# Patient Record
Sex: FEMALE | Race: WHITE | NOT HISPANIC OR LATINO
[De-identification: names, ages, dates, MRNs, and addresses within clinical notes are randomized per-mention and may not be internally consistent; named-entity substitution may affect disease eponyms.]

---

## 2018-04-17 ENCOUNTER — TRANSCRIPTION ENCOUNTER (OUTPATIENT)
Age: 63
End: 2018-04-17

## 2018-04-17 ENCOUNTER — INPATIENT (INPATIENT)
Facility: HOSPITAL | Age: 63
LOS: 2 days | Discharge: HOME | End: 2018-04-20
Attending: INTERNAL MEDICINE | Admitting: INTERNAL MEDICINE
Payer: COMMERCIAL

## 2018-04-17 VITALS
TEMPERATURE: 98 F | HEART RATE: 118 BPM | RESPIRATION RATE: 20 BRPM | OXYGEN SATURATION: 97 % | SYSTOLIC BLOOD PRESSURE: 192 MMHG | DIASTOLIC BLOOD PRESSURE: 122 MMHG

## 2018-04-17 DIAGNOSIS — I21.3 ST ELEVATION (STEMI) MYOCARDIAL INFARCTION OF UNSPECIFIED SITE: ICD-10-CM

## 2018-04-17 DIAGNOSIS — D75.1 SECONDARY POLYCYTHEMIA: ICD-10-CM

## 2018-04-17 DIAGNOSIS — Z29.9 ENCOUNTER FOR PROPHYLACTIC MEASURES, UNSPECIFIED: ICD-10-CM

## 2018-04-17 DIAGNOSIS — I10 ESSENTIAL (PRIMARY) HYPERTENSION: ICD-10-CM

## 2018-04-17 DIAGNOSIS — I63.9 CEREBRAL INFARCTION, UNSPECIFIED: ICD-10-CM

## 2018-04-17 LAB
ALBUMIN SERPL ELPH-MCNC: 4.3 G/DL — SIGNIFICANT CHANGE UP (ref 3.3–5)
ALBUMIN SERPL ELPH-MCNC: 4.3 G/DL — SIGNIFICANT CHANGE UP (ref 3.3–5)
ALP SERPL-CCNC: 85 U/L — SIGNIFICANT CHANGE UP (ref 40–120)
ALP SERPL-CCNC: 85 U/L — SIGNIFICANT CHANGE UP (ref 40–120)
ALT FLD-CCNC: 26 U/L — SIGNIFICANT CHANGE UP (ref 4–33)
ALT FLD-CCNC: 28 U/L — SIGNIFICANT CHANGE UP (ref 4–33)
APTT BLD: 121.6 SEC — CRITICAL HIGH (ref 27.5–37.4)
APTT BLD: 27.7 SEC — SIGNIFICANT CHANGE UP (ref 27.5–37.4)
AST SERPL-CCNC: 43 U/L — HIGH (ref 4–32)
AST SERPL-CCNC: 45 U/L — HIGH (ref 4–32)
BASE EXCESS BLDV CALC-SCNC: 2.4 MMOL/L — SIGNIFICANT CHANGE UP
BASOPHILS # BLD AUTO: 0.09 K/UL — SIGNIFICANT CHANGE UP (ref 0–0.2)
BASOPHILS # BLD AUTO: 0.12 K/UL — SIGNIFICANT CHANGE UP (ref 0–0.2)
BASOPHILS NFR BLD AUTO: 0.8 % — SIGNIFICANT CHANGE UP (ref 0–2)
BASOPHILS NFR BLD AUTO: 0.9 % — SIGNIFICANT CHANGE UP (ref 0–2)
BILIRUB SERPL-MCNC: 0.4 MG/DL — SIGNIFICANT CHANGE UP (ref 0.2–1.2)
BILIRUB SERPL-MCNC: 0.4 MG/DL — SIGNIFICANT CHANGE UP (ref 0.2–1.2)
BLD GP AB SCN SERPL QL: NEGATIVE — SIGNIFICANT CHANGE UP
BLOOD GAS VENOUS - CREATININE: 0.68 MG/DL — SIGNIFICANT CHANGE UP (ref 0.5–1.3)
BUN SERPL-MCNC: 10 MG/DL — SIGNIFICANT CHANGE UP (ref 7–23)
BUN SERPL-MCNC: 10 MG/DL — SIGNIFICANT CHANGE UP (ref 7–23)
CALCIUM SERPL-MCNC: 9.2 MG/DL — SIGNIFICANT CHANGE UP (ref 8.4–10.5)
CALCIUM SERPL-MCNC: 9.2 MG/DL — SIGNIFICANT CHANGE UP (ref 8.4–10.5)
CHLORIDE BLDV-SCNC: 107 MMOL/L — SIGNIFICANT CHANGE UP (ref 96–108)
CHLORIDE SERPL-SCNC: 96 MMOL/L — LOW (ref 98–107)
CHLORIDE SERPL-SCNC: 97 MMOL/L — LOW (ref 98–107)
CK MB BLD-MCNC: 12.6 — HIGH (ref 0–2.5)
CK MB BLD-MCNC: 301.2 NG/ML — HIGH (ref 1–4.7)
CK SERPL-CCNC: 2392 U/L — HIGH (ref 25–170)
CK SERPL-CCNC: 354 U/L — HIGH (ref 25–170)
CK SERPL-CCNC: 358 U/L — HIGH (ref 25–170)
CO2 SERPL-SCNC: 20 MMOL/L — LOW (ref 22–31)
CO2 SERPL-SCNC: 22 MMOL/L — SIGNIFICANT CHANGE UP (ref 22–31)
CREAT SERPL-MCNC: 0.7 MG/DL — SIGNIFICANT CHANGE UP (ref 0.5–1.3)
CREAT SERPL-MCNC: 0.72 MG/DL — SIGNIFICANT CHANGE UP (ref 0.5–1.3)
EOSINOPHIL # BLD AUTO: 0.06 K/UL — SIGNIFICANT CHANGE UP (ref 0–0.5)
EOSINOPHIL # BLD AUTO: 0.08 K/UL — SIGNIFICANT CHANGE UP (ref 0–0.5)
EOSINOPHIL NFR BLD AUTO: 0.5 % — SIGNIFICANT CHANGE UP (ref 0–6)
EOSINOPHIL NFR BLD AUTO: 0.6 % — SIGNIFICANT CHANGE UP (ref 0–6)
GAS PNL BLDV: 133 MMOL/L — LOW (ref 136–146)
GLUCOSE BLDV-MCNC: 114 — HIGH (ref 70–99)
GLUCOSE SERPL-MCNC: 109 MG/DL — HIGH (ref 70–99)
GLUCOSE SERPL-MCNC: 114 MG/DL — HIGH (ref 70–99)
HBA1C BLD-MCNC: 5.2 % — SIGNIFICANT CHANGE UP (ref 4–5.6)
HCO3 BLDV-SCNC: 26 MMOL/L — SIGNIFICANT CHANGE UP (ref 20–27)
HCT VFR BLD CALC: 50.7 % — HIGH (ref 34.5–45)
HCT VFR BLD CALC: 53 % — HIGH (ref 34.5–45)
HCT VFR BLDV CALC: 51.6 % — HIGH (ref 34.5–45)
HGB BLD-MCNC: 16.6 G/DL — HIGH (ref 11.5–15.5)
HGB BLD-MCNC: 17.8 G/DL — HIGH (ref 11.5–15.5)
HGB BLDV-MCNC: 16.9 G/DL — HIGH (ref 11.5–15.5)
IMM GRANULOCYTES # BLD AUTO: 0.04 # — SIGNIFICANT CHANGE UP
IMM GRANULOCYTES # BLD AUTO: 0.05 # — SIGNIFICANT CHANGE UP
IMM GRANULOCYTES NFR BLD AUTO: 0.3 % — SIGNIFICANT CHANGE UP (ref 0–1.5)
IMM GRANULOCYTES NFR BLD AUTO: 0.4 % — SIGNIFICANT CHANGE UP (ref 0–1.5)
INR BLD: 0.9 — SIGNIFICANT CHANGE UP (ref 0.88–1.17)
INR BLD: 3.45 — HIGH (ref 0.88–1.17)
LACTATE BLDV-MCNC: 3.5 MMOL/L — HIGH (ref 0.5–2)
LYMPHOCYTES # BLD AUTO: 2.51 K/UL — SIGNIFICANT CHANGE UP (ref 1–3.3)
LYMPHOCYTES # BLD AUTO: 2.85 K/UL — SIGNIFICANT CHANGE UP (ref 1–3.3)
LYMPHOCYTES # BLD AUTO: 21 % — SIGNIFICANT CHANGE UP (ref 13–44)
LYMPHOCYTES # BLD AUTO: 21.8 % — SIGNIFICANT CHANGE UP (ref 13–44)
MCHC RBC-ENTMCNC: 31.1 PG — SIGNIFICANT CHANGE UP (ref 27–34)
MCHC RBC-ENTMCNC: 32.2 PG — SIGNIFICANT CHANGE UP (ref 27–34)
MCHC RBC-ENTMCNC: 32.7 % — SIGNIFICANT CHANGE UP (ref 32–36)
MCHC RBC-ENTMCNC: 33.6 % — SIGNIFICANT CHANGE UP (ref 32–36)
MCV RBC AUTO: 95.1 FL — SIGNIFICANT CHANGE UP (ref 80–100)
MCV RBC AUTO: 96 FL — SIGNIFICANT CHANGE UP (ref 80–100)
MONOCYTES # BLD AUTO: 0.96 K/UL — HIGH (ref 0–0.9)
MONOCYTES # BLD AUTO: 1.17 K/UL — HIGH (ref 0–0.9)
MONOCYTES NFR BLD AUTO: 8 % — SIGNIFICANT CHANGE UP (ref 2–14)
MONOCYTES NFR BLD AUTO: 9 % — SIGNIFICANT CHANGE UP (ref 2–14)
NEUTROPHILS # BLD AUTO: 8.29 K/UL — HIGH (ref 1.8–7.4)
NEUTROPHILS # BLD AUTO: 8.78 K/UL — HIGH (ref 1.8–7.4)
NEUTROPHILS NFR BLD AUTO: 67.3 % — SIGNIFICANT CHANGE UP (ref 43–77)
NEUTROPHILS NFR BLD AUTO: 69.4 % — SIGNIFICANT CHANGE UP (ref 43–77)
NRBC # FLD: 0 — SIGNIFICANT CHANGE UP
NRBC # FLD: 0 — SIGNIFICANT CHANGE UP
PCO2 BLDV: 43 MMHG — SIGNIFICANT CHANGE UP (ref 41–51)
PH BLDV: 7.41 PH — SIGNIFICANT CHANGE UP (ref 7.32–7.43)
PLATELET # BLD AUTO: 272 K/UL — SIGNIFICANT CHANGE UP (ref 150–400)
PLATELET # BLD AUTO: 324 K/UL — SIGNIFICANT CHANGE UP (ref 150–400)
PMV BLD: 10.3 FL — SIGNIFICANT CHANGE UP (ref 7–13)
PMV BLD: 10.5 FL — SIGNIFICANT CHANGE UP (ref 7–13)
PO2 BLDV: 38 MMHG — SIGNIFICANT CHANGE UP (ref 35–40)
POTASSIUM BLDV-SCNC: 4.6 MMOL/L — HIGH (ref 3.4–4.5)
POTASSIUM SERPL-MCNC: 4.6 MMOL/L — SIGNIFICANT CHANGE UP (ref 3.5–5.3)
POTASSIUM SERPL-MCNC: 4.7 MMOL/L — SIGNIFICANT CHANGE UP (ref 3.5–5.3)
POTASSIUM SERPL-SCNC: 4.6 MMOL/L — SIGNIFICANT CHANGE UP (ref 3.5–5.3)
POTASSIUM SERPL-SCNC: 4.7 MMOL/L — SIGNIFICANT CHANGE UP (ref 3.5–5.3)
PROT SERPL-MCNC: 7.3 G/DL — SIGNIFICANT CHANGE UP (ref 6–8.3)
PROT SERPL-MCNC: 7.3 G/DL — SIGNIFICANT CHANGE UP (ref 6–8.3)
PROTHROM AB SERPL-ACNC: 10.3 SEC — SIGNIFICANT CHANGE UP (ref 9.8–13.1)
PROTHROM AB SERPL-ACNC: 40.7 SEC — HIGH (ref 9.8–13.1)
RBC # BLD: 5.33 M/UL — HIGH (ref 3.8–5.2)
RBC # BLD: 5.52 M/UL — HIGH (ref 3.8–5.2)
RBC # FLD: 13.2 % — SIGNIFICANT CHANGE UP (ref 10.3–14.5)
RBC # FLD: 13.4 % — SIGNIFICANT CHANGE UP (ref 10.3–14.5)
RH IG SCN BLD-IMP: POSITIVE — SIGNIFICANT CHANGE UP
SAO2 % BLDV: 71.2 % — SIGNIFICANT CHANGE UP (ref 60–85)
SODIUM SERPL-SCNC: 137 MMOL/L — SIGNIFICANT CHANGE UP (ref 135–145)
SODIUM SERPL-SCNC: 137 MMOL/L — SIGNIFICANT CHANGE UP (ref 135–145)
TROPONIN T SERPL-MCNC: 0.13 NG/ML — HIGH (ref 0–0.06)
TROPONIN T SERPL-MCNC: 0.19 NG/ML — HIGH (ref 0–0.06)
TROPONIN T SERPL-MCNC: 4.62 NG/ML — HIGH (ref 0–0.06)
WBC # BLD: 11.95 K/UL — HIGH (ref 3.8–10.5)
WBC # BLD: 13.05 K/UL — HIGH (ref 3.8–10.5)
WBC # FLD AUTO: 11.95 K/UL — HIGH (ref 3.8–10.5)
WBC # FLD AUTO: 13.05 K/UL — HIGH (ref 3.8–10.5)

## 2018-04-17 PROCEDURE — 76937 US GUIDE VASCULAR ACCESS: CPT | Mod: 26

## 2018-04-17 PROCEDURE — 93458 L HRT ARTERY/VENTRICLE ANGIO: CPT | Mod: 26,59

## 2018-04-17 PROCEDURE — 92941 PRQ TRLML REVSC TOT OCCL AMI: CPT | Mod: LD

## 2018-04-17 RX ORDER — TICAGRELOR 90 MG/1
180 TABLET ORAL ONCE
Qty: 0 | Refills: 0 | Status: COMPLETED | OUTPATIENT
Start: 2018-04-17 | End: 2018-04-17

## 2018-04-17 RX ORDER — HEPARIN SODIUM 5000 [USP'U]/ML
INJECTION INTRAVENOUS; SUBCUTANEOUS
Qty: 25000 | Refills: 0 | Status: DISCONTINUED | OUTPATIENT
Start: 2018-04-17 | End: 2018-04-17

## 2018-04-17 RX ORDER — ATORVASTATIN CALCIUM 80 MG/1
80 TABLET, FILM COATED ORAL AT BEDTIME
Qty: 0 | Refills: 0 | Status: DISCONTINUED | OUTPATIENT
Start: 2018-04-17 | End: 2018-04-20

## 2018-04-17 RX ORDER — LANOLIN ALCOHOL/MO/W.PET/CERES
3 CREAM (GRAM) TOPICAL AT BEDTIME
Qty: 0 | Refills: 0 | Status: DISCONTINUED | OUTPATIENT
Start: 2018-04-17 | End: 2018-04-20

## 2018-04-17 RX ORDER — ASPIRIN/CALCIUM CARB/MAGNESIUM 324 MG
81 TABLET ORAL DAILY
Qty: 0 | Refills: 0 | Status: DISCONTINUED | OUTPATIENT
Start: 2018-04-18 | End: 2018-04-18

## 2018-04-17 RX ORDER — ASPIRIN/CALCIUM CARB/MAGNESIUM 324 MG
325 TABLET ORAL ONCE
Qty: 0 | Refills: 0 | Status: COMPLETED | OUTPATIENT
Start: 2018-04-17 | End: 2018-04-17

## 2018-04-17 RX ORDER — TICAGRELOR 90 MG/1
90 TABLET ORAL
Qty: 0 | Refills: 0 | Status: DISCONTINUED | OUTPATIENT
Start: 2018-04-18 | End: 2018-04-20

## 2018-04-17 RX ORDER — METOPROLOL TARTRATE 50 MG
25 TABLET ORAL
Qty: 0 | Refills: 0 | Status: DISCONTINUED | OUTPATIENT
Start: 2018-04-17 | End: 2018-04-20

## 2018-04-17 RX ORDER — HEPARIN SODIUM 5000 [USP'U]/ML
4000 INJECTION INTRAVENOUS; SUBCUTANEOUS ONCE
Qty: 0 | Refills: 0 | Status: DISCONTINUED | OUTPATIENT
Start: 2018-04-17 | End: 2018-04-17

## 2018-04-17 RX ORDER — CHLORHEXIDINE GLUCONATE 213 G/1000ML
1 SOLUTION TOPICAL
Qty: 0 | Refills: 0 | Status: DISCONTINUED | OUTPATIENT
Start: 2018-04-17 | End: 2018-04-20

## 2018-04-17 RX ORDER — HEPARIN SODIUM 5000 [USP'U]/ML
4000 INJECTION INTRAVENOUS; SUBCUTANEOUS EVERY 6 HOURS
Qty: 0 | Refills: 0 | Status: DISCONTINUED | OUTPATIENT
Start: 2018-04-17 | End: 2018-04-17

## 2018-04-17 RX ORDER — HEPARIN SODIUM 5000 [USP'U]/ML
5000 INJECTION INTRAVENOUS; SUBCUTANEOUS EVERY 8 HOURS
Qty: 0 | Refills: 0 | Status: DISCONTINUED | OUTPATIENT
Start: 2018-04-17 | End: 2018-04-20

## 2018-04-17 RX ORDER — MORPHINE SULFATE 50 MG/1
2 CAPSULE, EXTENDED RELEASE ORAL EVERY 6 HOURS
Qty: 0 | Refills: 0 | Status: DISCONTINUED | OUTPATIENT
Start: 2018-04-17 | End: 2018-04-20

## 2018-04-17 RX ADMIN — MORPHINE SULFATE 2 MILLIGRAM(S): 50 CAPSULE, EXTENDED RELEASE ORAL at 15:43

## 2018-04-17 RX ADMIN — HEPARIN SODIUM 5000 UNIT(S): 5000 INJECTION INTRAVENOUS; SUBCUTANEOUS at 21:38

## 2018-04-17 RX ADMIN — Medication 25 MILLIGRAM(S): at 21:38

## 2018-04-17 RX ADMIN — Medication 25 MILLIGRAM(S): at 12:18

## 2018-04-17 RX ADMIN — TICAGRELOR 180 MILLIGRAM(S): 90 TABLET ORAL at 09:58

## 2018-04-17 RX ADMIN — MORPHINE SULFATE 2 MILLIGRAM(S): 50 CAPSULE, EXTENDED RELEASE ORAL at 16:00

## 2018-04-17 RX ADMIN — Medication 3 MILLIGRAM(S): at 21:38

## 2018-04-17 RX ADMIN — Medication 325 MILLIGRAM(S): at 09:58

## 2018-04-17 RX ADMIN — ATORVASTATIN CALCIUM 80 MILLIGRAM(S): 80 TABLET, FILM COATED ORAL at 21:38

## 2018-04-17 NOTE — DISCHARGE NOTE ADULT - MEDICATION SUMMARY - MEDICATIONS TO TAKE
I will START or STAY ON the medications listed below when I get home from the hospital:    aspirin 81 mg oral delayed release tablet  -- 1 tab(s) by mouth once a day  -- Indication: For ST elevation myocardial infarction (STEMI)    lisinopril 5 mg oral tablet  -- 1 tab(s) by mouth once a day  -- Indication: For ST elevation myocardial infarction (STEMI)    atorvastatin 80 mg oral tablet  -- 1 tab(s) by mouth once a day (at bedtime)  -- Indication: For ST elevation myocardial infarction (STEMI)    ticagrelor 90 mg oral tablet  -- 1 tab(s) by mouth 2 times a day  -- Indication: For ST elevation myocardial infarction (STEMI)    Metoprolol Succinate ER 50 mg oral tablet, extended release  -- 1 tab(s) by mouth once a day  -- Indication: For ST elevation myocardial infarction (STEMI)

## 2018-04-17 NOTE — H&P ADULT - NSHPSOCIALHISTORY_GEN_ALL_CORE
Work: retired .   Drugs: EtOH abuse - 2 shots of gins every day (last day night of 4/16), smoker in past (4 cigarettes for 10 years, quit 20 years ago), no other IVDU.   Home: lives at home with .

## 2018-04-17 NOTE — DISCHARGE NOTE ADULT - CARE PLAN
Principal Discharge DX:	ST elevation myocardial infarction involving left anterior descending (LAD) coronary artery  Goal:	remain painfree  Assessment and plan of treatment:	follow DASH/LF diet, take all meds as prescribed, followup as instructed Principal Discharge DX:	ST elevation myocardial infarction involving left anterior descending (LAD) coronary artery  Goal:	prevention of repeat heart attack  Assessment and plan of treatment:	You must take your medications as prescribed. Most importantly, Brillinta and Aspirin are very important. If you do not take these medications, the stent placed to open up your coronary artery (the vessels that supply your heart) can clog up again. These medications will also prevent future episodes of heart attacks and strokes. It is very important for you to take these medications and to follow up with a physician. Please call and make an appointment at the number below.     Cardiology Clinic, [address]  [address] [place] [address]  Phone: [phone]  Fax: [phone] Principal Discharge DX:	ST elevation myocardial infarction involving left anterior descending (LAD) coronary artery  Goal:	prevention of repeat heart attack  Assessment and plan of treatment:	You must take your medications as prescribed. Most importantly, Brillinta and Aspirin are very important. If you do not take these medications, the stent placed to open up your coronary artery (the vessels that supply your heart) can clog up again. These medications will also prevent future episodes of heart attacks and strokes. It is very important for you to take these medications and to follow up with a physician. We have scheduled an appointment for you on May 15th at 1:45PM at the cardiology clinic (you will be seeing Dr. Pamela Webster). The clinic is located on the 4th floor in the oncology building of [facility].      Cardiology Clinic, [address]  [address] [place] [address]  Phone: [phone]  Fax: [phone]

## 2018-04-17 NOTE — H&P ADULT - PROBLEM SELECTOR PLAN 1
- s/p aspirin and brillinta load, PCI to LAD  - high intensity statin, metoprolol 25 BID started.   - aspirin 81 daily and brillinta 90 BID to start tomorrow.   - medication education, given patient's history of nonadherence. - s/p aspirin and brillinta load, PCI to p/mid-LAD.  - high intensity statin, metoprolol 25 BID started.   - aspirin 81 daily and brillinta 90 BID to start tomorrow.   - will trend cardiac enzymes to ensure downtrend.   - medication education, given patient's history of nonadherence.

## 2018-04-17 NOTE — DISCHARGE NOTE ADULT - CARE PROVIDER_API CALL
Cardiology Clinic, [facility]  [address] [address] [place] [address]  [id number] [place] [address]  Phone: [phone]  Fax: [phone]

## 2018-04-17 NOTE — DISCHARGE NOTE ADULT - HOSPITAL COURSE
62F with CVA and subclinical hypothyroidism presents with 4 day h/o chest pressure with exertion and B/L arm pain (patient had been gardening) taking Aleve thinking this was musculoskeletal. Today arm and chest pain were persistent and she came to ER with EKG + for AWSTEMI, Taken to cath lab with TRI X 1 to long pLAD lesion. Of note patient admits to being non-adherent to medical regimen, has not had followup in 7 years, stopped taking ASA and statin, was also recommended to undergo hypercoagulability w/u after CVA but did not do so. In CCU patient has been stable, metoprolol has been started. Awaiting eventual initiation of ACE and ECHO planned for 4/20. 62F with CVA and subclinical hypothyroidism presents with 4 day h/o chest pressure with exertion and B/L arm pain (patient had been gardening) taking Aleve thinking this was musculoskeletal. Today arm and chest pain were persistent and she came to ER with EKG + for AWSTEMI, Taken to cath lab with TRI X 1 to long pLAD lesion. Of note patient admits to being non-adherent to medical regimen, has not had followup in 7 years, stopped taking ASA and statin, was also recommended to undergo hypercoagulability w/u after CVA but did not do so. In CCU patient has been stable, metoprolol has been started. Lisinopril started and uptitrated to 5mg daily. TTE showed moderately reduced LV systolic dysfunction, hypokinesis of apex/distal septum/distal anterior wall, and no LV thrombus. RV function grossly normal. 62F with CVA and subclinical hypothyroidism presents with 4 day h/o chest pressure with exertion and B/L arm pain (patient had been gardening) taking Aleve thinking this was musculoskeletal. Today arm and chest pain were persistent and she came to ER with EKG + for AWSTEMI in setting of positive and uptrending Troponin T, Taken to cath lab with TRI X 1 to long pLAD lesion. Of note patient admits to being non-adherent to medical regimen, has not had followup in 7 years, stopped taking ASA and statin, was also recommended to undergo hypercoagulability w/u after CVA but did not do so. In CCU, patient was started on and discharged on Toprol XL 50, lisinopril 5, Brillinta 90BID, Aspirin 81mg daily. TTE showed moderately reduced LV systolic dysfunction, hypokinesis of apex/distal septum/distal anterior wall, and no LV thrombus. RV function grossly normal. Of note, patient had history of EtOH abuse with at least 2 shots of gin every night prior to presentation. However, there were no signs of EtOH withdrawal. Patient noted to have polycythemia, which is thought to have been 2/2 hypoxia from underlying undiagnosed YESSICA, given patient's body habitus. Instructed to follow up as outpatient (i.e. outpatient sleep study). Patient remained stable throughout CCU course.

## 2018-04-17 NOTE — H&P ADULT - NSHPOUTPATIENTPROVIDERS_GEN_ALL_CORE
no PCP, has not followed up in 7 years, does not remember name no PCP, has not followed up in 7 years, does not remember name.  No cardiologist.

## 2018-04-17 NOTE — H&P ADULT - ASSESSMENT
62-year-old lady with CVA (2015, MCA, left basal ganglia and frontal infarction, no tele events) and subclinical hypothyroidism, presenting with intermittent chest pressure radiating to left arm for several days. Found to have 62-year-old lady with CVA (2015, MCA, left basal ganglia and frontal infarction, no tele events) and subclinical hypothyroidism, presenting with intermittent chest pressure with radiation bilaterally to upper extremities. 62-year-old lady with CVA (2015, MCA, left basal ganglia and frontal infarction, no tele events) and subclinical hypothyroidism, presenting with intermittent chest pressure with radiation bilaterally to upper extremities. Found to have STEMI 62-year-old lady with CVA (2015, MCA, left basal ganglia and frontal infarction, no tele events) and subclinical hypothyroidism, presenting with intermittent chest pressure with radiation bilaterally to upper extremities. Found to have AWSTEMI, now s/p PCI to LAD. 62-year-old lady with CVA (2015, MCA, left basal ganglia and frontal infarction, no tele events) and subclinical hypothyroidism, presenting with intermittent chest pressure with radiation bilaterally to upper extremities. Found to have AWSTEMI, now s/p PCI with 1 stent to proximal and mid-LAD.

## 2018-04-17 NOTE — ED PROVIDER NOTE - OBJECTIVE STATEMENT
61 yo F PMHx CVA 3-4 years ago not on any medications p/w bilateral arm pain x 3 days. Pt has been taking Aleve at home with some relief and then this AM at 0400 pain resumed and was associated with chest tightness. She took Aleve this AM before she came and it did not help with her symptoms. She denies SOB, N/V, abd pain. Denies tobacco, drinks ETOH "problematically."

## 2018-04-17 NOTE — DISCHARGE NOTE ADULT - NS AS ACTIVITY OBS
No Heavy lifting/straining/Stairs allowed/Walking-Outdoors allowed/Bathing allowed/Walking-Indoors allowed

## 2018-04-17 NOTE — H&P ADULT - HISTORY OF PRESENT ILLNESS
62-year-old lady with CVA (2015, MCA, left basal ganglia and frontal infarction, no tele events) and subclinical hypothyroidism, presenting with intermittent chest pressure radiating to left arm for several days. Presented to ED with worsening chest and arm pain that woke patient up from her sleep. EKG in ED                   RADIOLOGY & ADDITIONAL TESTS:    Imaging Personally Reviewed:  [ ] YES  [ ] NO    HEALTH ISSUES - PROBLEM Dx: 62-year-old lady with CVA (2015, MCA, left basal ganglia and frontal infarction, still w/ residual word-finding difficulty) and subclinical hypothyroidism, presenting with intermittent chest pressure with stabbing pain radiating bilaterally to arms. Patient states that she had exertional chest pain (noticed when pulling weeds in garden) for 4 days prior to presentation. Felt that arm pain was musculoskeletal in nature and took Alleve without symptomatic relief. Presented to ED with worsening chest and arm pain that woke patient up from her sleep. Denies any recent illnesses, stressors, travel, sick contacts. Denies any associated fever, chills, palpitations, dyspnea, edema in extremities. Of note, patient is non-adherent to medications and has no medical follow up (saw PCP 7 years ago). She had been discharged on Aspirin and Atorvastatin with instructions for a hypercoagulable w/u after presenting with CVA 5 years ago to [facility]. However, patient has not been taking any medications (stopped taking Aspirin 2 years ago) and has not followed up as instructed.     In the ED, vital significant for hypertension to 192/122, HR 110s-120s, % on RA, afebrile. EKG with ST elevations in all precordial leads. Cardiac enzymes positive and uptrending on second set. Cath lab activated. Patient aspirin and brillinta loaded, startet on heparin gtt. PCI to LAD. 62-year-old lady with CVA (2015, MCA, left basal ganglia and frontal infarction, still w/ residual word-finding difficulty) and subclinical hypothyroidism, presenting with intermittent chest pressure with stabbing pain radiating bilaterally to arms. Patient states that she had exertional chest pain (noticed when pulling weeds in garden) for 4 days prior to presentation. Felt that arm pain was musculoskeletal in nature and took Alleve without symptomatic relief. Presented to ED with worsening chest and arm pain that woke patient up from her sleep. Denies any recent illnesses, stressors, travel, sick contacts. Denies any associated fever, chills, palpitations, dyspnea, edema in extremities. Of note, patient is non-adherent to medications and has no medical follow up (saw PCP 7 years ago). She had been discharged on Aspirin and Atorvastatin with instructions for a hypercoagulable w/u after presenting with CVA 5 years ago to [facility]. However, patient has not been taking any medications (stopped taking Aspirin 2 years ago) and has not followed up as instructed.     In the ED, vital significant for hypertension to 192/122, HR 110s-120s, % on RA, afebrile. EKG with ST elevations in all precordial leads. Cardiac enzymes positive and uptrending on second set. Cath lab activated. Patient aspirin and brillinta loaded, startet on heparin gtt. PCI to LAD. 62-year-old lady with CVA (2015, MCA, left basal ganglia and frontal infarction, still w/ residual word-finding difficulty) and subclinical hypothyroidism, presenting with intermittent chest pressure with stabbing pain radiating bilaterally to arms. Patient states that she had exertional chest pain (noticed when pulling weeds in garden) for 4 days prior to presentation. Felt that arm pain was musculoskeletal in nature and took Alleve without symptomatic relief. Presented to ED with worsening chest and arm pain that woke patient up from her sleep. Denies any recent illnesses, stressors, travel, sick contacts. Denies any associated fever, chills, palpitations, dyspnea, edema in extremities. Of note, patient is non-adherent to medications and has no medical follow up (saw PCP 7 years ago). She had been discharged on Aspirin and Atorvastatin with instructions for a hypercoagulable w/u after presenting with CVA 5 years ago to [facility]. However, patient has not been taking any medications (stopped taking Aspirin 2 years ago) and has not followed up as instructed.     In the ED, vital significant for hypertension to 192/122, HR 110s-120s, % on RA, afebrile. EKG with ST elevations in all precordial leads. Cardiac enzymes positive and uptrending on second set. Cath lab activated. Patient aspirin and brillinta loaded, startet on heparin gtt. PCI with 1 stent to pLAD (70% stenosis) and mid-LAD (100% stenosis).

## 2018-04-17 NOTE — ED ADULT NURSE NOTE - OBJECTIVE STATEMENT
Patient brought to ER with c/o chest pressure. Pt has family at bedside. Pt's EKG shows a STEMI and cardiac at bedside. VS recorded. IVL placed to right arm and left wrist 20 gauge and labs drawn and sent. Pt placed on Zoll and transported to cath lab. Medications given as ordered.   RENEA Ziegler

## 2018-04-17 NOTE — DISCHARGE NOTE ADULT - PLAN OF CARE
remain painfree follow DASH/LF diet, take all meds as prescribed, followup as instructed prevention of repeat heart attack You must take your medications as prescribed. Most importantly, Brillinta and Aspirin are very important. If you do not take these medications, the stent placed to open up your coronary artery (the vessels that supply your heart) can clog up again. These medications will also prevent future episodes of heart attacks and strokes. It is very important for you to take these medications and to follow up with a physician. Please call and make an appointment at the number below.     Cardiology Clinic, [address]  [address] [place] [address]  Phone: [phone]  Fax: [phone] You must take your medications as prescribed. Most importantly, Brillinta and Aspirin are very important. If you do not take these medications, the stent placed to open up your coronary artery (the vessels that supply your heart) can clog up again. These medications will also prevent future episodes of heart attacks and strokes. It is very important for you to take these medications and to follow up with a physician. We have scheduled an appointment for you on May 15th at 1:45PM at the cardiology clinic (you will be seeing Dr. Pamela Webster). The clinic is located on the 4th floor in the oncology building of [facility].      Cardiology Clinic, [address]  [address] [place] [address]  Phone: [phone]  Fax: [phone]

## 2018-04-17 NOTE — CHART NOTE - NSCHARTNOTEFT_GEN_A_CORE
RFA 6 Fr sheath removed, good hemostasis noted with 20-25 minute manual hold. No evidence of hematoma. R DP Pulse 2+ palp and with doppler. DSD in place. VS stable throughout.

## 2018-04-17 NOTE — H&P ADULT - NSHPREVIEWOFSYSTEMS_GEN_ALL_CORE
REVIEW OF SYSTEMS:  CONSTITUTIONAL: No fever, weight loss, or fatigue  EYES: No eye pain, visual disturbances, or discharge  ENMT:  No difficulty hearing, tinnitus, vertigo; No sinus or throat pain  NECK: No pain or stiffness  BREASTS: No pain, masses, or nipple discharge  RESPIRATORY: No cough, wheezing, chills or hemoptysis; No shortness of breath  CARDIOVASCULAR: No chest pain, palpitations, dizziness, or leg swelling  GASTROINTESTINAL: No abdominal or epigastric pain. No nausea, vomiting, or hematemesis; No diarrhea or constipation. No melena or hematochezia.  GENITOURINARY: No dysuria, frequency, hematuria, or incontinence  NEUROLOGICAL: No headaches, memory loss, loss of strength, numbness, or tremors  SKIN: No itching, burning, rashes, or lesions   LYMPH NODES: No enlarged glands  ENDOCRINE: No heat or cold intolerance; No hair loss  MUSCULOSKELETAL: No joint pain or swelling; No muscle, back, or extremity pain  PSYCHIATRIC: No depression, anxiety, mood swings, or difficulty sleeping  HEME/LYMPH: No easy bruising, or bleeding gums  ALLERY AND IMMUNOLOGIC: No hives or eczema REVIEW OF SYSTEMS:  CONSTITUTIONAL: No fever, weight loss, or fatigue.  EYES: No eye pain, visual disturbances, or discharge.  ENMT:  No rhinorrhea, cough.   RESPIRATORY: No cough, wheezing, SOB.   CARDIOVASCULAR: +chest pressure with stabbing arm pain bilaterally.   GASTROINTESTINAL: no abdominal pain, n/v, diarrhea.   GENITOURINARY: No dysuria.  NEUROLOGICAL: +word finding difficulty, no weakness.   ENDOCRINE: No heat or cold intolerance; No hair loss  MUSCULOSKELETAL: No joint pain or swelling. No muscular pain (+bilateral arm pain).

## 2018-04-17 NOTE — CONSULT NOTE ADULT - ASSESSMENT
62F CVA 3-4 years ago w/ 3-4 days of intermittent chest tightness and arm pain that would last for several hours, resolve and return presenting with worsening chest pain this AM    #Chest pain - story and EKG findings consistent with ACS. Cath lab activated. Aspirin and Brilinta loaded. Heparin gtt initiated. Patient consented and case discussed with Interventionalist.     MALATHI Matute MD  Cardiology Fellow

## 2018-04-17 NOTE — H&P ADULT - PROBLEM SELECTOR PLAN 4
- DVT ppx: SQH   - Diet: DASH/TLC  - Dispo: home after 4-5 day monitoring s/p STEMI. Ambulates around with no issues at baseline. Will reassess need for PT eval.     Luca Choi MD   PGY1  Pager: [phone] - Hgb/Hct increased with associated increase in RBC count.   - likely 2/2 hypoxia, suspect patient is chronically hypoxic from YESSICA (not diagnosed).   - will monitor O2 via tele throughout the night to assess need for CPAP.   - outpatient f/u.

## 2018-04-17 NOTE — H&P ADULT - PROBLEM SELECTOR PLAN 2
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  - - prior CVA in 2013 with residual word-finding difficulty.  - on aspirin 81mg, high-intensity statin.

## 2018-04-17 NOTE — H&P ADULT - PROBLEM SELECTOR PLAN 3
- not on any home antihypertensives.   - hypertension now resolved after starting metoprolol tartrate 25 BID.   - will monitor. If with persistent HTN, will start on ACE inhibitor/ARB.

## 2018-04-17 NOTE — ED ADULT TRIAGE NOTE - CHIEF COMPLAINT QUOTE
pt coming by EMS with robinson. upper arms pain x 4 days pt AOX 3 pt stated chest pressure while in her way to hosp.    denies SOB/dizziness at present time.   Hx. CVA w/o residuals

## 2018-04-17 NOTE — H&P ADULT - NSHPLABSRESULTS_GEN_ALL_CORE
LABS:                        16.6   11.95 )-----------( 324      ( 17 Apr 2018 09:52 )             50.7     04-17    137  |  96<L>  |  10  ----------------------------<  114<H>  4.7   |  22  |  0.72    Ca    9.2      17 Apr 2018 09:52    TPro  7.3  /  Alb  4.3  /  TBili  0.4  /  DBili  x   /  AST  45<H>  /  ALT  26  /  AlkPhos  85  04-17    PT/INR - ( 17 Apr 2018 09:52 )   PT: 10.3 SEC;   INR: 0.90          PTT - ( 17 Apr 2018 09:52 )  PTT:27.7 SEC

## 2018-04-17 NOTE — CONSULT NOTE ADULT - SUBJECTIVE AND OBJECTIVE BOX
Registration Time: 9:19  Called by ED: 9:40AM  Saw patient at bedside: 9:42AM  Called Cath Attendin:45    Patient seen and evaluated at bedside    Chief Complaint: Arm Pain    HPI: 62F CVA 3-4 years ago w/ 3-4 days of intermittent chest tightness and arm pain that would last for several hours, resolve and return. This morning around 4AM she woke with worsening pain radiating to the arms. She asked her  to bring her in. Patient is currently still experiencing chest tightness. She took two Alieve for the pain.      PMH:   CVA (cerebral vascular accident)  Obese      PSH:    delivery delivered      Medications:   heparin  Infusion.  Unit(s)/Hr IV Continuous <Continuous>  heparin  Injectable 4000 Unit(s) IV Push once  heparin  Injectable 4000 Unit(s) IV Push every 6 hours PRN      Allergies:  No Known Allergies      FAMILY HISTORY: Father MI      Social History:  Smoking History: Former  Alcohol Use: "Problematic dinking"  Drug Use: No    Review of Systems:  REVIEW OF SYSTEMS:    CONSTITUTIONAL: No weakness, fevers or chills  EYES/ENT: No visual changes;  No dysphagia  NECK: No pain or stiffness  RESPIRATORY: No cough, wheezing, hemoptysis; No shortness of breath  CARDIOVASCULAR: No chest pain or palpitations; No lower extremity edema  GASTROINTESTINAL: No abdominal or epigastric pain. No nausea, vomiting, or hematemesis; No diarrhea or constipation. No melena or hematochezia.  BACK: No back pain  GENITOURINARY: No dysuria, frequency or hematuria  NEUROLOGICAL: No numbness or weakness  SKIN: No itching, burning, rashes, or lesions   All other review of systems is negative unless indicated above.    Physical Exam:  T(F): 97.9 (-), Max: 97.9 ()  HR: 128 () (118 - 128)  BP: 176/112 () (176/112 - 192/122)  RR: 22 (-)  SpO2: 100% ()  GENERAL: No acute distress, well-developed  HEAD:  Atraumatic, Normocephalic  ENT: EOMI, PERRLA, conjunctiva and sclera clear, Neck supple, No JVD, moist mucosa  CHEST/LUNG: Clear to auscultation bilaterally; No wheeze, equal breath sounds bilaterally   BACK: No spinal tenderness  HEART: Regular rate and rhythm; No murmurs, rubs, or gallops  ABDOMEN: Soft, Nontender, Nondistended; Bowel sounds present  EXTREMITIES:  No clubbing, cyanosis, or edema  PSYCH: Nl behavior, nl affect  NEUROLOGY: AAOx3, non-focal, cranial nerves intact  SKIN: Normal color, No rashes or lesions  LINES:    Cardiovascular Diagnostic Testing:    ECG: Personally reviewed    Echo:    Stress Testing:    Cath:    Interpretation of Telemetry:    Imaging:    Labs: Personally reviewed

## 2018-04-17 NOTE — ED PROVIDER NOTE - MEDICAL DECISION MAKING DETAILS
63 yo F PMHx CVA 3-4 years ago not on any medications p/w bilateral arm pain and chest tightness since 0400 with EKG changes consistent with STEMI, cards cath consult called, basic labs drawn, will await cards recommendations 63 yo F PMHx CVA 3-4 years ago not on any medications p/w bilateral arm pain and chest tightness since 0400 with EKG changes consistent with STEMI, cards cath consult called, basic labs drawn, will await cards recommendations    MARTHA Leyva MD: Pt is a 61 y/o female with PMHx CVA 3-4 years ago (not on any medications) is BIBA with bilateral arm pain x 3 days (waxing and waning) associated with chest tightness that began at 4am. Pt has been taking Aleve at home with some relief until this AM. Per , pt first c/o sx 4 days ago when "pulling some weeds" in the yard. She denies SOB, N/V, abd pain, focal numbness/weakness, back pain, pleuritic CP. Denies tobacco, + ETOH. EKG demonstrates STEMI with STEs in V1-V3. STEMI alert called right away, Cardiology fellow at bedside who agrees with dx of STEMI, recommends 325mg ASA, 180mg brillinta, bolus of heparin. Will give initial medications and send pt to cardiac catheterization lab.

## 2018-04-17 NOTE — DISCHARGE NOTE ADULT - PROVIDER TOKENS
FREE:[LAST:[Cardiology Clinic],FIRST:[[facility]],PHONE:[phone]],FAX:[phone]],ADDRESS:[[address] [place] [address]]]

## 2018-04-17 NOTE — ED PROVIDER NOTE - NS ED ROS FT
GENERAL: No fever or chills, EYES: no change in vision, HEENT: no trouble swallowing or speaking, CARDIAC: chest tightness, PULMONARY: no cough or SOB, GI: no abdominal pain, no nausea, no vomiting, no diarrhea or constipation, : No changes in urination, SKIN: no rashes, NEURO: no headache,  MSK: bilateral arm pain ~Selena Olvera M.D. Resident

## 2018-04-17 NOTE — H&P ADULT - NSHPPHYSICALEXAM_GEN_ALL_CORE
T(C): 36.6 (04-17-18 @ 11:30), Max: 36.6 (04-17-18 @ 09:26)  HR: 103 (04-17-18 @ 11:30) (103 - 128)  BP: 161/98 (04-17-18 @ 11:30) (161/98 - 192/122)  RR: 21 (04-17-18 @ 11:30) (20 - 22)  SpO2: 100% (04-17-18 @ 11:30) (97% - 100%)  Wt(kg): --Vital Signs Last 24 Hrs  T(C): 36.6 (17 Apr 2018 11:30), Max: 36.6 (17 Apr 2018 09:26)  T(F): 97.8 (17 Apr 2018 11:30), Max: 97.9 (17 Apr 2018 09:26)  HR: 103 (17 Apr 2018 11:30) (103 - 128)  BP: 161/98 (17 Apr 2018 11:30) (161/98 - 192/122)  BP(mean): 114 (17 Apr 2018 11:30) (114 - 114)  RR: 21 (17 Apr 2018 11:30) (20 - 22)  SpO2: 100% (17 Apr 2018 11:30) (97% - 100%)    PHYSICAL EXAM:  GENERAL: NAD, well-groomed, well-developed  HEAD:  Atraumatic, Normocephalic  EYES: EOMI, PERRLA, conjunctiva and sclera clear  ENMT: No tonsillar erythema, exudates, or enlargement; Moist mucous membranes, Good dentition, No lesions  NECK: Supple, No JVD, Normal thyroid  NERVOUS SYSTEM:  Alert & Oriented X3, Good concentration; Motor Strength 5/5 B/L upper and lower extremities; DTRs 2+ intact and symmetric  CHEST/LUNG: Clear to percussion bilaterally; No rales, rhonchi, wheezing, or rubs  HEART: Regular rate and rhythm; No murmurs, rubs, or gallops  ABDOMEN: Soft, Nontender, Nondistended; Bowel sounds present  EXTREMITIES:  2+ Peripheral Pulses, No clubbing, cyanosis, or edema  LYMPH: No lymphadenopathy noted  SKIN: No rashes or lesions

## 2018-04-17 NOTE — DISCHARGE NOTE ADULT - PATIENT PORTAL LINK FT
You can access the [facility] Patient Portal, offered by [facility], by registering with the following website: [email or web address]

## 2018-04-17 NOTE — H&P ADULT - PROBLEM SELECTOR PLAN 5
- DVT ppx: SQH   - Diet: DASH/TLC  - Dispo: home after 4-5 day monitoring s/p STEMI. Ambulates around with no issues at baseline. Will reassess need for PT eval.     Luca Choi MD   PGY1  Pager: [phone]

## 2018-04-18 DIAGNOSIS — R94.6 ABNORMAL RESULTS OF THYROID FUNCTION STUDIES: ICD-10-CM

## 2018-04-18 DIAGNOSIS — F10.10 ALCOHOL ABUSE, UNCOMPLICATED: ICD-10-CM

## 2018-04-18 LAB
ALBUMIN SERPL ELPH-MCNC: 3.6 G/DL — SIGNIFICANT CHANGE UP (ref 3.3–5)
ALP SERPL-CCNC: 72 U/L — SIGNIFICANT CHANGE UP (ref 40–120)
ALT FLD-CCNC: 33 U/L — SIGNIFICANT CHANGE UP (ref 4–33)
AST SERPL-CCNC: 110 U/L — HIGH (ref 4–32)
BILIRUB SERPL-MCNC: 0.9 MG/DL — SIGNIFICANT CHANGE UP (ref 0.2–1.2)
BUN SERPL-MCNC: 11 MG/DL — SIGNIFICANT CHANGE UP (ref 7–23)
CALCIUM SERPL-MCNC: 8.9 MG/DL — SIGNIFICANT CHANGE UP (ref 8.4–10.5)
CHLORIDE SERPL-SCNC: 99 MMOL/L — SIGNIFICANT CHANGE UP (ref 98–107)
CHOLEST SERPL-MCNC: 233 MG/DL — HIGH (ref 120–199)
CK MB BLD-MCNC: 10.3 — HIGH (ref 0–2.5)
CK MB BLD-MCNC: 78.15 NG/ML — HIGH (ref 1–4.7)
CK SERPL-CCNC: 762 U/L — HIGH (ref 25–170)
CO2 SERPL-SCNC: 21 MMOL/L — LOW (ref 22–31)
CREAT SERPL-MCNC: 0.75 MG/DL — SIGNIFICANT CHANGE UP (ref 0.5–1.3)
GLUCOSE SERPL-MCNC: 121 MG/DL — HIGH (ref 70–99)
HCT VFR BLD CALC: 46.4 % — HIGH (ref 34.5–45)
HDLC SERPL-MCNC: 50 MG/DL — SIGNIFICANT CHANGE UP (ref 45–65)
HGB BLD-MCNC: 15.5 G/DL — SIGNIFICANT CHANGE UP (ref 11.5–15.5)
LIPID PNL WITH DIRECT LDL SERPL: 141 MG/DL — SIGNIFICANT CHANGE UP
MAGNESIUM SERPL-MCNC: 2.1 MG/DL — SIGNIFICANT CHANGE UP (ref 1.6–2.6)
MCHC RBC-ENTMCNC: 31.8 PG — SIGNIFICANT CHANGE UP (ref 27–34)
MCHC RBC-ENTMCNC: 33.4 % — SIGNIFICANT CHANGE UP (ref 32–36)
MCV RBC AUTO: 95.3 FL — SIGNIFICANT CHANGE UP (ref 80–100)
NRBC # FLD: 0 — SIGNIFICANT CHANGE UP
PHOSPHATE SERPL-MCNC: 3.4 MG/DL — SIGNIFICANT CHANGE UP (ref 2.5–4.5)
PLATELET # BLD AUTO: 279 K/UL — SIGNIFICANT CHANGE UP (ref 150–400)
PMV BLD: 10.3 FL — SIGNIFICANT CHANGE UP (ref 7–13)
POTASSIUM SERPL-MCNC: 3.7 MMOL/L — SIGNIFICANT CHANGE UP (ref 3.5–5.3)
POTASSIUM SERPL-SCNC: 3.7 MMOL/L — SIGNIFICANT CHANGE UP (ref 3.5–5.3)
PROT SERPL-MCNC: 6.1 G/DL — SIGNIFICANT CHANGE UP (ref 6–8.3)
RBC # BLD: 4.87 M/UL — SIGNIFICANT CHANGE UP (ref 3.8–5.2)
RBC # FLD: 13.6 % — SIGNIFICANT CHANGE UP (ref 10.3–14.5)
SODIUM SERPL-SCNC: 137 MMOL/L — SIGNIFICANT CHANGE UP (ref 135–145)
T3 SERPL-MCNC: 101 NG/DL — SIGNIFICANT CHANGE UP (ref 80–200)
T4 AB SER-ACNC: 7.13 UG/DL — SIGNIFICANT CHANGE UP (ref 5.1–13)
TRIGL SERPL-MCNC: 317 MG/DL — HIGH (ref 10–149)
TROPONIN T SERPL-MCNC: 2.35 NG/ML — HIGH (ref 0–0.06)
TSH SERPL-MCNC: 8.06 UIU/ML — HIGH (ref 0.27–4.2)
WBC # BLD: 9.88 K/UL — SIGNIFICANT CHANGE UP (ref 3.8–10.5)
WBC # FLD AUTO: 9.88 K/UL — SIGNIFICANT CHANGE UP (ref 3.8–10.5)

## 2018-04-18 PROCEDURE — 99233 SBSQ HOSP IP/OBS HIGH 50: CPT

## 2018-04-18 RX ORDER — DOCUSATE SODIUM 100 MG
100 CAPSULE ORAL
Qty: 0 | Refills: 0 | Status: DISCONTINUED | OUTPATIENT
Start: 2018-04-18 | End: 2018-04-20

## 2018-04-18 RX ORDER — POTASSIUM CHLORIDE 20 MEQ
20 PACKET (EA) ORAL ONCE
Qty: 0 | Refills: 0 | Status: COMPLETED | OUTPATIENT
Start: 2018-04-18 | End: 2018-04-18

## 2018-04-18 RX ORDER — SENNA PLUS 8.6 MG/1
2 TABLET ORAL AT BEDTIME
Qty: 0 | Refills: 0 | Status: DISCONTINUED | OUTPATIENT
Start: 2018-04-18 | End: 2018-04-20

## 2018-04-18 RX ORDER — LISINOPRIL 2.5 MG/1
2.5 TABLET ORAL DAILY
Qty: 0 | Refills: 0 | Status: DISCONTINUED | OUTPATIENT
Start: 2018-04-18 | End: 2018-04-19

## 2018-04-18 RX ORDER — LISINOPRIL 2.5 MG/1
2.5 TABLET ORAL DAILY
Qty: 0 | Refills: 0 | Status: DISCONTINUED | OUTPATIENT
Start: 2018-04-18 | End: 2018-04-18

## 2018-04-18 RX ORDER — ASPIRIN/CALCIUM CARB/MAGNESIUM 324 MG
81 TABLET ORAL DAILY
Qty: 0 | Refills: 0 | Status: DISCONTINUED | OUTPATIENT
Start: 2018-04-18 | End: 2018-04-20

## 2018-04-18 RX ORDER — ONDANSETRON 8 MG/1
4 TABLET, FILM COATED ORAL ONCE
Qty: 0 | Refills: 0 | Status: COMPLETED | OUTPATIENT
Start: 2018-04-18 | End: 2018-04-18

## 2018-04-18 RX ADMIN — HEPARIN SODIUM 5000 UNIT(S): 5000 INJECTION INTRAVENOUS; SUBCUTANEOUS at 06:26

## 2018-04-18 RX ADMIN — SENNA PLUS 2 TABLET(S): 8.6 TABLET ORAL at 21:22

## 2018-04-18 RX ADMIN — HEPARIN SODIUM 5000 UNIT(S): 5000 INJECTION INTRAVENOUS; SUBCUTANEOUS at 21:22

## 2018-04-18 RX ADMIN — LISINOPRIL 2.5 MILLIGRAM(S): 2.5 TABLET ORAL at 14:49

## 2018-04-18 RX ADMIN — Medication 3 MILLIGRAM(S): at 21:22

## 2018-04-18 RX ADMIN — Medication 81 MILLIGRAM(S): at 12:17

## 2018-04-18 RX ADMIN — TICAGRELOR 90 MILLIGRAM(S): 90 TABLET ORAL at 17:53

## 2018-04-18 RX ADMIN — Medication 100 MILLIGRAM(S): at 21:22

## 2018-04-18 RX ADMIN — ATORVASTATIN CALCIUM 80 MILLIGRAM(S): 80 TABLET, FILM COATED ORAL at 21:22

## 2018-04-18 RX ADMIN — Medication 25 MILLIGRAM(S): at 17:53

## 2018-04-18 RX ADMIN — Medication 20 MILLIEQUIVALENT(S): at 06:43

## 2018-04-18 RX ADMIN — HEPARIN SODIUM 5000 UNIT(S): 5000 INJECTION INTRAVENOUS; SUBCUTANEOUS at 14:49

## 2018-04-18 RX ADMIN — CHLORHEXIDINE GLUCONATE 1 APPLICATION(S): 213 SOLUTION TOPICAL at 12:18

## 2018-04-18 RX ADMIN — Medication 25 MILLIGRAM(S): at 06:25

## 2018-04-18 RX ADMIN — ONDANSETRON 4 MILLIGRAM(S): 8 TABLET, FILM COATED ORAL at 08:30

## 2018-04-18 RX ADMIN — TICAGRELOR 90 MILLIGRAM(S): 90 TABLET ORAL at 06:25

## 2018-04-18 NOTE — PROGRESS NOTE ADULT - PROBLEM SELECTOR PLAN 2
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  - on aspirin 81mg, high-intensity statin. - history of 2 shots of gins every day. Last drink on night of 4/16. Still within window of withdrawal.   - Ativan 2mg PRN IVP q4 with agitation.

## 2018-04-18 NOTE — PROGRESS NOTE ADULT - PROBLEM SELECTOR PLAN 5
- DVT ppx: SQH   - Diet: DASH/TLC  - Dispo: home after 4-5 day monitoring s/p STEMI. Ambulates around with no issues at baseline. Will reassess need for PT eval.     Luca Choi MD   PGY1  Pager: [phone] - TSH mildly elevated at 8.06 with normal total T4.   - difficult to assess current thyroid status as patient with critical illness and stress state.   - will instruct patient for outpatient f/u.

## 2018-04-18 NOTE — PROGRESS NOTE ADULT - PROBLEM SELECTOR PLAN 3
- not on any home antihypertensives.   - hypertension now resolved after starting metoprolol tartrate 25 BID.   - will monitor. If with persistent HTN, will start on ACE inhibitor/ARB. 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  - on aspirin 81mg, high-intensity statin.

## 2018-04-18 NOTE — PROGRESS NOTE ADULT - SUBJECTIVE AND OBJECTIVE BOX
[facility] Progress Note  Consult Spectra [id number]    Interval Events:  -     MEDICATIONS:  aspirin  chewable 81 milliGRAM(s) Oral daily  heparin  Injectable 5000 Unit(s) SubCutaneous every 8 hours  metoprolol tartrate 25 milliGRAM(s) Oral two times a day  ticagrelor 90 milliGRAM(s) Oral two times a day    melatonin 3 milliGRAM(s) Oral at bedtime  morphine  - Injectable 2 milliGRAM(s) IV Push every 6 hours PRN    atorvastatin 80 milliGRAM(s) Oral at bedtime    chlorhexidine 4% Liquid 1 Application(s) Topical <User Schedule>    PHYSICAL EXAM:  T(C): 36.6 (18 @ 08:00), Max: 36.8 (18 @ 15:52)  HR: 81 (18 @ 08:00) (81 - 128)  BP: 124/72 (18 @ 08:00) (110/82 - 192/122)  RR: 15 (18 @ 08:00) (15 - 30)  SpO2: 97% (18 @ 08:00) (93% - 100%)  Wt(kg): --  I&O's Summary    2018 07:01  -  2018 07:00  --------------------------------------------------------  IN: 620 mL / OUT: 2000 mL / NET: -1380 mL    Daily Height in cm: 154.94 (2018 11:30)    Daily Weight in k.3 (2018 05:00)    Appearance: Normal	  HEENT:   Normal oral mucosa, PERRL, EOMI	  Lymphatic: No lymphadenopathy  Cardiovascular: Normal S1 S2, No JVD, No murmurs, No edema  Respiratory: Lungs clear to auscultation	  Psychiatry: A & O x 3, Mood & affect appropriate  Gastrointestinal:  soft nt nd, normoactive bs  Skin: No rashes, No ecchymoses, No cyanosis	  Neurologic: Non-focal  Extremities: Normal range of motion, No clubbing, cyanosis  Vascular: Peripheral pulses palpable 2+ bilaterally    LABS:	 	    CBC Full  -  ( 2018 05:10 )  WBC Count : 9.88 K/uL  Hemoglobin : 15.5 g/dL  Hematocrit : 46.4 %  Platelet Count - Automated : 279 K/uL  Mean Cell Volume : 95.3 fL  Mean Cell Hemoglobin : 31.8 pg  Mean Cell Hemoglobin Concentration : 33.4 %  Auto Neutrophil # : x  Auto Lymphocyte # : x  Auto Monocyte # : x  Auto Eosinophil # : x  Auto Basophil # : x  Auto Neutrophil % : x  Auto Lymphocyte % : x  Auto Monocyte % : x  Auto Eosinophil % : x  Auto Basophil % : x        137  |  99  |  11  ----------------------------<  121<H>  3.7   |  21<L>  |  0.75      137  |  96<L>  |  10  ----------------------------<  114<H>  4.7   |  22  |  0.72    Ca    8.9      2018 05:10  Ca    9.2      2018 09:52  Phos  3.4       Mg     2.1         TPro  6.1  /  Alb  3.6  /  TBili  0.9  /  DBili  x   /  AST  110<H>  /  ALT  33  /  AlkPhos  72    TPro  7.3  /  Alb  4.3  /  TBili  0.4  /  DBili  x   /  AST  45<H>  /  ALT  26  /  AlkPhos  85        proBNP:   Lipid Profile:   HgA1c: Hemoglobin A1C, Whole Blood: 5.2 % ( @ 18:00)    TSH: Thyroid Stimulating Hormone, Serum: 8.06 uIU/mL ( @ 05:10)    CARDIAC MARKERS:  Troponin T, Serum: 2.35 ng/mL ( 05:10)  Troponin T, Serum: 4.62 ng/mL ( 18:00)  Troponin T, Serum: 0.19 ng/mL ( @ 09:52)  Troponin T, Serum: 0.13 ng/mL ( @ 09:45)      CKMB: 78.15 ng/mL ( 05:10)  CKMB: 301.20 ng/mL ( @ 18:00)    CKMB Relative Index: 10.3 (04-18 @ 05:10)  CKMB Relative Index: 12.6 ( @ 18:00)      TELEMETRY: 	    ECG:  	  RADIOLOGY:  OTHER: 	    PREVIOUS DIAGNOSTIC TESTING:    [ ] Echocardiogram:  [ ] Catheterization:  [ ] Stress Test: [facility] Progress Note  Consult Spectra [id number]    Interval Events:  - No tele events.   - Nausea, given Zofran 4 IVP x1. Denies chest pain, palpitations, SOB, groin pain.     MEDICATIONS:  aspirin  chewable 81 milliGRAM(s) Oral daily  heparin  Injectable 5000 Unit(s) SubCutaneous every 8 hours  metoprolol tartrate 25 milliGRAM(s) Oral two times a day  ticagrelor 90 milliGRAM(s) Oral two times a day    melatonin 3 milliGRAM(s) Oral at bedtime  morphine  - Injectable 2 milliGRAM(s) IV Push every 6 hours PRN    atorvastatin 80 milliGRAM(s) Oral at bedtime    chlorhexidine 4% Liquid 1 Application(s) Topical <User Schedule>    PHYSICAL EXAM:  T(C): 36.6 (18 @ 08:00), Max: 36.8 (18 @ 15:52)  HR: 81 (18 @ 08:00) (81 - 128)  BP: 124/72 (18 @ 08:00) (110/82 - 192/122)  RR: 15 (18 @ 08:00) (15 - 30)  SpO2: 97% (18 @ 08:00) (93% - 100%)  Wt(kg): --  I&O's Summary    2018 07:01  -  2018 07:00  --------------------------------------------------------  IN: 620 mL / OUT: 2000 mL / NET: -1380 mL    Daily Height in cm: 154.94 (2018 11:30)    Daily Weight in k.3 (2018 05:00)    Appearance: NADl	  Cardiovascular: Normal S1 S2, No JVD although difficult to appreciate due to body habitus. No murmurs, No edema  Respiratory: Lungs clear to auscultation.	  Psychiatry: A & O x 3, Mood & affect appropriate  Gastrointestinal: +obese abdomen. Soft nt nd, normoactive bs.  Skin: Groin cath site C/D/I.	  Neurologic: Non-focal, walking around.   Vascular: Warm extremities with no peripheral edema.     LABS:	 	    CBC Full  -  ( 2018 05:10 )  WBC Count : 9.88 K/uL  Hemoglobin : 15.5 g/dL  Hematocrit : 46.4 %  Platelet Count - Automated : 279 K/uL  Mean Cell Volume : 95.3 fL  Mean Cell Hemoglobin : 31.8 pg  Mean Cell Hemoglobin Concentration : 33.4 %  Auto Neutrophil # : x  Auto Lymphocyte # : x  Auto Monocyte # : x  Auto Eosinophil # : x  Auto Basophil # : x  Auto Neutrophil % : x  Auto Lymphocyte % : x  Auto Monocyte % : x  Auto Eosinophil % : x  Auto Basophil % : x        137  |  99  |  11  ----------------------------<  121<H>  3.7   |  21<L>  |  0.75      137  |  96<L>  |  10  ----------------------------<  114<H>  4.7   |  22  |  0.72    Ca    8.9      2018 05:10  Ca    9.2      2018 09:52  Phos  3.4       Mg     2.1         TPro  6.1  /  Alb  3.6  /  TBili  0.9  /  DBili  x   /  AST  110<H>  /  ALT  33  /  AlkPhos  72    TPro  7.3  /  Alb  4.3  /  TBili  0.4  /  DBili  x   /  AST  45<H>  /  ALT  26  /  AlkPhos  85        proBNP:   Lipid Profile:   HgA1c: Hemoglobin A1C, Whole Blood: 5.2 % ( @ 18:00)    TSH: Thyroid Stimulating Hormone, Serum: 8.06 uIU/mL ( 05:10)    CARDIAC MARKERS:  Troponin T, Serum: 2.35 ng/mL ( 05:10)  Troponin T, Serum: 4.62 ng/mL ( 18:00)  Troponin T, Serum: 0.19 ng/mL ( 09:52)  Troponin T, Serum: 0.13 ng/mL ( @ 09:45)    CKMB: 78.15 ng/mL ( 05:10)  CKMB: 301.20 ng/mL (04-17 @ 18:00)    CKMB Relative Index: 10.3 ( @ 05:10)  CKMB Relative Index: 12.6 ( @ 18:00)

## 2018-04-18 NOTE — PROGRESS NOTE ADULT - PROBLEM SELECTOR PLAN 1
- s/p aspirin and brillinta load, PCI to p/mid-LAD.  - continue with high intensity statin, metoprolol 25 BID.   - aspirin 81 daily and brillinta 90 BID to started today.   - cardiac enzymes peaked and downtrending.   - medication education, given patient's history of nonadherence. - s/p aspirin and brillinta load, PCI to p/mid-LAD.  - continue with high intensity statin, metoprolol 25 BID.   - aspirin 81 daily and brillinta 90 BID to started today.   - cardiac enzymes peaked and downtrending.   - lisinopril 2.5 daily started. Will uptitrate as tolerated.   - medication education, given patient's history of nonadherence. - s/p aspirin and brillinta load, PCI to p/mid-LAD.  - TTE on 4/20.   - continue with high intensity statin, metoprolol 25 BID.   - aspirin 81 daily and brillinta 90 BID to started today.   - cardiac enzymes peaked and downtrending.   - lisinopril 2.5 daily started. Will uptitrate as tolerated.   - medication education, given patient's history of nonadherence.

## 2018-04-18 NOTE — PROGRESS NOTE ADULT - PROBLEM SELECTOR PLAN 7
- DVT ppx: SQH   - Diet: DASH/TLC  - Dispo: home after 4-5 day monitoring s/p STEMI. Ambulates around with no issues at baseline. Will reassess need for PT eval.     Luca Choi MD   PGY1  Pager: [phone]

## 2018-04-18 NOTE — PROGRESS NOTE ADULT - PROBLEM SELECTOR PLAN 4
- Hgb/Hct increased with associated increase in RBC count.   - likely 2/2 hypoxia, suspect patient is chronically hypoxic from YESSICA (not diagnosed).   - will monitor O2 via tele throughout the night to assess need for CPAP.   - outpatient f/u. - not on any home antihypertensives.   - hypertension now resolved after starting metoprolol tartrate 25 BID.   - started on lisinopril 2.5 daily. Will uptitrate as tolerated.

## 2018-04-19 LAB
ALBUMIN SERPL ELPH-MCNC: 3.7 G/DL — SIGNIFICANT CHANGE UP (ref 3.3–5)
ALP SERPL-CCNC: 75 U/L — SIGNIFICANT CHANGE UP (ref 40–120)
ALT FLD-CCNC: 30 U/L — SIGNIFICANT CHANGE UP (ref 4–33)
AST SERPL-CCNC: 45 U/L — HIGH (ref 4–32)
BILIRUB SERPL-MCNC: 1 MG/DL — SIGNIFICANT CHANGE UP (ref 0.2–1.2)
BUN SERPL-MCNC: 16 MG/DL — SIGNIFICANT CHANGE UP (ref 7–23)
CALCIUM SERPL-MCNC: 9 MG/DL — SIGNIFICANT CHANGE UP (ref 8.4–10.5)
CHLORIDE SERPL-SCNC: 98 MMOL/L — SIGNIFICANT CHANGE UP (ref 98–107)
CO2 SERPL-SCNC: 22 MMOL/L — SIGNIFICANT CHANGE UP (ref 22–31)
CREAT SERPL-MCNC: 0.92 MG/DL — SIGNIFICANT CHANGE UP (ref 0.5–1.3)
GLUCOSE SERPL-MCNC: 121 MG/DL — HIGH (ref 70–99)
HCT VFR BLD CALC: 48 % — HIGH (ref 34.5–45)
HGB BLD-MCNC: 15.6 G/DL — HIGH (ref 11.5–15.5)
MAGNESIUM SERPL-MCNC: 2.2 MG/DL — SIGNIFICANT CHANGE UP (ref 1.6–2.6)
MCHC RBC-ENTMCNC: 31.8 PG — SIGNIFICANT CHANGE UP (ref 27–34)
MCHC RBC-ENTMCNC: 32.5 % — SIGNIFICANT CHANGE UP (ref 32–36)
MCV RBC AUTO: 98 FL — SIGNIFICANT CHANGE UP (ref 80–100)
NRBC # FLD: 0 — SIGNIFICANT CHANGE UP
PHOSPHATE SERPL-MCNC: 3.7 MG/DL — SIGNIFICANT CHANGE UP (ref 2.5–4.5)
PLATELET # BLD AUTO: 293 K/UL — SIGNIFICANT CHANGE UP (ref 150–400)
PMV BLD: 10.4 FL — SIGNIFICANT CHANGE UP (ref 7–13)
POTASSIUM SERPL-MCNC: 3.9 MMOL/L — SIGNIFICANT CHANGE UP (ref 3.5–5.3)
POTASSIUM SERPL-SCNC: 3.9 MMOL/L — SIGNIFICANT CHANGE UP (ref 3.5–5.3)
PROT SERPL-MCNC: 6.5 G/DL — SIGNIFICANT CHANGE UP (ref 6–8.3)
RBC # BLD: 4.9 M/UL — SIGNIFICANT CHANGE UP (ref 3.8–5.2)
RBC # FLD: 13.2 % — SIGNIFICANT CHANGE UP (ref 10.3–14.5)
SODIUM SERPL-SCNC: 136 MMOL/L — SIGNIFICANT CHANGE UP (ref 135–145)
WBC # BLD: 10.59 K/UL — HIGH (ref 3.8–10.5)
WBC # FLD AUTO: 10.59 K/UL — HIGH (ref 3.8–10.5)

## 2018-04-19 PROCEDURE — 99233 SBSQ HOSP IP/OBS HIGH 50: CPT

## 2018-04-19 PROCEDURE — 93306 TTE W/DOPPLER COMPLETE: CPT | Mod: 26

## 2018-04-19 RX ORDER — LISINOPRIL 2.5 MG/1
5 TABLET ORAL DAILY
Qty: 0 | Refills: 0 | Status: DISCONTINUED | OUTPATIENT
Start: 2018-04-19 | End: 2018-04-20

## 2018-04-19 RX ADMIN — TICAGRELOR 90 MILLIGRAM(S): 90 TABLET ORAL at 17:54

## 2018-04-19 RX ADMIN — Medication 3 MILLIGRAM(S): at 22:41

## 2018-04-19 RX ADMIN — TICAGRELOR 90 MILLIGRAM(S): 90 TABLET ORAL at 06:34

## 2018-04-19 RX ADMIN — SENNA PLUS 2 TABLET(S): 8.6 TABLET ORAL at 22:41

## 2018-04-19 RX ADMIN — Medication 81 MILLIGRAM(S): at 12:51

## 2018-04-19 RX ADMIN — LISINOPRIL 2.5 MILLIGRAM(S): 2.5 TABLET ORAL at 06:35

## 2018-04-19 RX ADMIN — Medication 100 MILLIGRAM(S): at 17:55

## 2018-04-19 RX ADMIN — Medication 100 MILLIGRAM(S): at 06:34

## 2018-04-19 RX ADMIN — CHLORHEXIDINE GLUCONATE 1 APPLICATION(S): 213 SOLUTION TOPICAL at 12:51

## 2018-04-19 RX ADMIN — ATORVASTATIN CALCIUM 80 MILLIGRAM(S): 80 TABLET, FILM COATED ORAL at 22:41

## 2018-04-19 RX ADMIN — HEPARIN SODIUM 5000 UNIT(S): 5000 INJECTION INTRAVENOUS; SUBCUTANEOUS at 06:35

## 2018-04-19 RX ADMIN — LISINOPRIL 5 MILLIGRAM(S): 2.5 TABLET ORAL at 12:51

## 2018-04-19 RX ADMIN — Medication 25 MILLIGRAM(S): at 06:35

## 2018-04-19 RX ADMIN — Medication 25 MILLIGRAM(S): at 17:54

## 2018-04-19 RX ADMIN — HEPARIN SODIUM 5000 UNIT(S): 5000 INJECTION INTRAVENOUS; SUBCUTANEOUS at 22:41

## 2018-04-19 NOTE — PROGRESS NOTE ADULT - PROBLEM SELECTOR PLAN 2
- history of 2 shots of gins every day. Last drink on night of 4/16. Still within window of withdrawal.   - Ativan 2mg PRN IVP q4 with agitation. - history of 2 shots of gins every day. Last drink on night of 4/16. Still within window of withdrawal.   - no signs of withdrawal. Now at 72hr jimmie.   - Ativan 2mg PRN IVP q4 with agitation.

## 2018-04-19 NOTE — PROGRESS NOTE ADULT - PROBLEM SELECTOR PLAN 1
- s/p aspirin and brillinta load, PCI to p/mid-LAD.  - TTE on 4/20.   - continue with high intensity statin, metoprolol 25 BID.   - aspirin 81 daily and brillinta 90 BID to started today.   - cardiac enzymes peaked and downtrending.   - lisinopril 2.5 daily started. Will uptitrate as tolerated.   - medication education, given patient's history of nonadherence. - s/p aspirin and brillinta load, PCI to p/mid-LAD.  - TTE tomorrow.   - continue with high intensity statin, metoprolol 25 BID, aspirin 81 daily, and brillinta 90 BID.   - cardiac enzymes peaked and downtrended.   - lisinopril 2.5 daily started. Will uptitrate as tolerated.   - medication education, given patient's history of nonadherence. - s/p aspirin and brillinta load, PCI to p/mid-LAD.  - TTE today.  - continue with high intensity statin, metoprolol 25 BID, aspirin 81 daily, and brillinta 90 BID.   - cardiac enzymes peaked and downtrended.   - lisinopril uptitrated to 5.0mg.   - importance of adherence and danger of stent occlusion explained thoroughly to patient.

## 2018-04-19 NOTE — PROGRESS NOTE ADULT - PROBLEM SELECTOR PLAN 5
- TSH mildly elevated at 8.06 with normal total T4.   - difficult to assess current thyroid status as patient with critical illness and stress state.   - will instruct patient for outpatient f/u.

## 2018-04-19 NOTE — PROGRESS NOTE ADULT - ATTENDING COMMENTS
Patient seen and examined  Agree with above assessment and plan  AWMI and PCI to LAD  Monitor for signs of ETOH withdrawl  Continue present meds
Patient seen and examined -agree with above assessment and plan  Check TTE  Cont DAPT  Cont metoprolol  Cont ACEi  DC planning tomorrow if remains stable

## 2018-04-19 NOTE — PROGRESS NOTE ADULT - PROBLEM SELECTOR PLAN 4
- not on any home antihypertensives.   - hypertension now resolved after starting metoprolol tartrate 25 BID.   - started on lisinopril 2.5 daily. Will uptitrate as tolerated. - not on any home antihypertensives.   - hypertension now resolved after starting metoprolol tartrate 25 BID. Also on Lisinopril 5.

## 2018-04-19 NOTE — PROGRESS NOTE ADULT - SUBJECTIVE AND OBJECTIVE BOX
CCU Progress Note    Interval Events:    MEDICATIONS:  aspirin enteric coated 81 milliGRAM(s) Oral daily  heparin  Injectable 5000 Unit(s) SubCutaneous every 8 hours  lisinopril 2.5 milliGRAM(s) Oral daily  metoprolol tartrate 25 milliGRAM(s) Oral two times a day  ticagrelor 90 milliGRAM(s) Oral two times a day    LORazepam   Injectable 2 milliGRAM(s) IV Push every 4 hours PRN  melatonin 3 milliGRAM(s) Oral at bedtime  morphine  - Injectable 2 milliGRAM(s) IV Push every 6 hours PRN    docusate sodium 100 milliGRAM(s) Oral two times a day  senna 2 Tablet(s) Oral at bedtime    atorvastatin 80 milliGRAM(s) Oral at bedtime    chlorhexidine 4% Liquid 1 Application(s) Topical <User Schedule>    PHYSICAL EXAM:  T(C): 36.8 (18 @ 07:00), Max: 36.9 (18 @ 17:00)  HR: 83 (18 @ 07:00) (64 - 103)  BP: 135/65 (18 @ 07:00) (98/85 - 135/65)  RR: 21 (18 @ 07:00) (17 - 28)  SpO2: 94% (18 @ 07:00) (92% - 98%)  Wt(kg): --  I&O's Summary    2018 07:01  -  2018 07:00  --------------------------------------------------------  IN: 950 mL / OUT: 550 mL / NET: 400 mL      Daily     Daily Weight in k (2018 05:00)    Appearance: NADl	  Cardiovascular: Normal S1 S2, No JVD although difficult to appreciate due to body habitus. No murmurs, No edema  Respiratory: Lungs clear to auscultation.	  Psychiatry: A & O x 3, Mood & affect appropriate  Gastrointestinal: +obese abdomen. Soft nt nd, normoactive bs.  Skin: Groin cath site C/D/I.	  Neurologic: Non-focal, walking around.   Vascular: Warm extremities with no peripheral edema. bilaterally    LABS:	 	    CBC Full  -  ( 2018 03:30 )  WBC Count : 10.59 K/uL  Hemoglobin : 15.6 g/dL  Hematocrit : 48.0 %  Platelet Count - Automated : 293 K/uL  Mean Cell Volume : 98.0 fL  Mean Cell Hemoglobin : 31.8 pg  Mean Cell Hemoglobin Concentration : 32.5 %  Auto Neutrophil # : x  Auto Lymphocyte # : x  Auto Monocyte # : x  Auto Eosinophil # : x  Auto Basophil # : x  Auto Neutrophil % : x  Auto Lymphocyte % : x  Auto Monocyte % : x  Auto Eosinophil % : x  Auto Basophil % : x        136  |  98  |  16  ----------------------------<  121<H>  3.9   |  22  |  0.92      137  |  99  |  11  ----------------------------<  121<H>  3.7   |  21<L>  |  0.75    Ca    9.0      2018 03:30  Ca    8.9      2018 05:10  Phos  3.7       Phos  3.4       Mg     2.2       Mg     2.1         TPro  6.5  /  Alb  3.7  /  TBili  1.0  /  DBili  x   /  AST  45<H>  /  ALT  30  /  AlkPhos  75    TPro  6.1  /  Alb  3.6  /  TBili  0.9  /  DBili  x   /  AST  110<H>  /  ALT  33  /  AlkPhos  72        proBNP:   Lipid Profile:   HgA1c: Hemoglobin A1C, Whole Blood: 5.2 % ( @ 18:00)    TSH: Thyroid Stimulating Hormone, Serum: 8.06 uIU/mL ( @ 05:10)      CARDIAC MARKERS:  Troponin T, Serum: 2.35 ng/mL ( 05:10)  Troponin T, Serum: 4.62 ng/mL ( @ 18:00)  Troponin T, Serum: 0.19 ng/mL ( @ 09:52)  Troponin T, Serum: 0.13 ng/mL ( @ 09:45)      CKMB: 78.15 ng/mL ( @ 05:10)  CKMB: 301.20 ng/mL ( @ 18:00)    CKMB Relative Index: 10.3 ( @ 05:10)  CKMB Relative Index: 12.6 ( @ 18:00)      TELEMETRY: 	    ECG:  	  RADIOLOGY:  OTHER: 	    PREVIOUS DIAGNOSTIC TESTING:    [ ] Echocardiogram:  [ ] Catheterization:  [ ] Stress Test: CCU Progress Note    Interval Events:      MEDICATIONS:  aspirin enteric coated 81 milliGRAM(s) Oral daily  heparin  Injectable 5000 Unit(s) SubCutaneous every 8 hours  lisinopril 2.5 milliGRAM(s) Oral daily  metoprolol tartrate 25 milliGRAM(s) Oral two times a day  ticagrelor 90 milliGRAM(s) Oral two times a day    LORazepam   Injectable 2 milliGRAM(s) IV Push every 4 hours PRN  melatonin 3 milliGRAM(s) Oral at bedtime  morphine  - Injectable 2 milliGRAM(s) IV Push every 6 hours PRN    docusate sodium 100 milliGRAM(s) Oral two times a day  senna 2 Tablet(s) Oral at bedtime    atorvastatin 80 milliGRAM(s) Oral at bedtime    chlorhexidine 4% Liquid 1 Application(s) Topical <User Schedule>    PHYSICAL EXAM:  T(C): 36.8 (18 @ 07:00), Max: 36.9 (18 @ 17:00)  HR: 83 (18 @ 07:00) (64 - 103)  BP: 135/65 (18 @ 07:00) (98/85 - 135/65)  RR: 21 (18 @ 07:00) (17 - 28)  SpO2: 94% (18 @ 07:00) (92% - 98%)  Wt(kg): --  I&O's Summary    2018 07:01  -  2018 07:00  --------------------------------------------------------  IN: 950 mL / OUT: 550 mL / NET: 400 mL      Daily     Daily Weight in k (2018 05:00)    Appearance: NADl	  Cardiovascular: Normal S1 S2, No JVD although difficult to appreciate due to body habitus. No murmurs, No edema  Respiratory: Lungs clear to auscultation.	  Psychiatry: A & O x 3, Mood & affect appropriate  Gastrointestinal: +obese abdomen. Soft nt nd, normoactive bs.  Skin: Groin cath site C/D/I.	  Neurologic: Non-focal, walking around.   Vascular: Warm extremities with no peripheral edema. bilaterally    LABS:	 	    CBC Full  -  ( 2018 03:30 )  WBC Count : 10.59 K/uL  Hemoglobin : 15.6 g/dL  Hematocrit : 48.0 %  Platelet Count - Automated : 293 K/uL  Mean Cell Volume : 98.0 fL  Mean Cell Hemoglobin : 31.8 pg  Mean Cell Hemoglobin Concentration : 32.5 %  Auto Neutrophil # : x  Auto Lymphocyte # : x  Auto Monocyte # : x  Auto Eosinophil # : x  Auto Basophil # : x  Auto Neutrophil % : x  Auto Lymphocyte % : x  Auto Monocyte % : x  Auto Eosinophil % : x  Auto Basophil % : x        136  |  98  |  16  ----------------------------<  121<H>  3.9   |  22  |  0.92      137  |  99  |  11  ----------------------------<  121<H>  3.7   |  21<L>  |  0.75    Ca    9.0      2018 03:30  Ca    8.9      2018 05:10  Phos  3.7       Phos  3.4       Mg     2.2       Mg     2.1         TPro  6.5  /  Alb  3.7  /  TBili  1.0  /  DBili  x   /  AST  45<H>  /  ALT  30  /  AlkPhos  75    TPro  6.1  /  Alb  3.6  /  TBili  0.9  /  DBili  x   /  AST  110<H>  /  ALT  33  /  AlkPhos  72        proBNP:   Lipid Profile:   HgA1c: Hemoglobin A1C, Whole Blood: 5.2 % ( @ 18:00)    TSH: Thyroid Stimulating Hormone, Serum: 8.06 uIU/mL ( @ 05:10)      CARDIAC MARKERS:  Troponin T, Serum: 2.35 ng/mL ( 05:10)  Troponin T, Serum: 4.62 ng/mL ( @ 18:00)  Troponin T, Serum: 0.19 ng/mL ( @ 09:52)  Troponin T, Serum: 0.13 ng/mL ( @ 09:45)      CKMB: 78.15 ng/mL ( @ 05:10)  CKMB: 301.20 ng/mL ( @ 18:00)    CKMB Relative Index: 10.3 ( @ 05:10)  CKMB Relative Index: 12.6 ( @ 18:00)      TELEMETRY: 	    ECG:  	  RADIOLOGY:  OTHER: 	    PREVIOUS DIAGNOSTIC TESTING:    [ ] Echocardiogram:  [ ] Catheterization:  [ ] Stress Test: CCU Progress Note    Interval Events:  - c/o dyspnea that is non-positionally-related.   - otherwise feeling well without CP, palpitations.     MEDICATIONS:  aspirin enteric coated 81 milliGRAM(s) Oral daily  heparin  Injectable 5000 Unit(s) SubCutaneous every 8 hours  lisinopril 2.5 milliGRAM(s) Oral daily  metoprolol tartrate 25 milliGRAM(s) Oral two times a day  ticagrelor 90 milliGRAM(s) Oral two times a day    LORazepam   Injectable 2 milliGRAM(s) IV Push every 4 hours PRN  melatonin 3 milliGRAM(s) Oral at bedtime  morphine  - Injectable 2 milliGRAM(s) IV Push every 6 hours PRN    docusate sodium 100 milliGRAM(s) Oral two times a day  senna 2 Tablet(s) Oral at bedtime    atorvastatin 80 milliGRAM(s) Oral at bedtime    chlorhexidine 4% Liquid 1 Application(s) Topical <User Schedule>    PHYSICAL EXAM:  T(C): 36.8 (18 @ 07:00), Max: 36.9 (18 @ 17:00)  HR: 83 (18 @ 07:00) (64 - 103)  BP: 135/65 (18 @ 07:00) (98/85 - 135/65)  RR: 21 (18 @ 07:00) (17 - 28)  SpO2: 94% (18 @ 07:00) (92% - 98%)  Wt(kg): --  I&O's Summary    2018 07:01  -  2018 07:00  --------------------------------------------------------  IN: 950 mL / OUT: 550 mL / NET: 400 mL      Daily     Daily Weight in k (2018 05:00)    Appearance: NADl	  Cardiovascular: Normal S1 S2, No JVD although difficult to appreciate due to body habitus. No murmurs, No edema  Respiratory: Lungs clear to auscultation.	  Psychiatry: A & O x 3, Mood & affect appropriate  Gastrointestinal: +obese abdomen. Soft nt nd, normoactive bs.  Skin: Groin cath site C/D/I.	  Neurologic: Non-focal, walking around.   Vascular: Warm extremities with no peripheral edema. bilaterally    LABS:	 	    CBC Full  -  ( 2018 03:30 )  WBC Count : 10.59 K/uL  Hemoglobin : 15.6 g/dL  Hematocrit : 48.0 %  Platelet Count - Automated : 293 K/uL  Mean Cell Volume : 98.0 fL  Mean Cell Hemoglobin : 31.8 pg  Mean Cell Hemoglobin Concentration : 32.5 %  Auto Neutrophil # : x  Auto Lymphocyte # : x  Auto Monocyte # : x  Auto Eosinophil # : x  Auto Basophil # : x  Auto Neutrophil % : x  Auto Lymphocyte % : x  Auto Monocyte % : x  Auto Eosinophil % : x  Auto Basophil % : x        136  |  98  |  16  ----------------------------<  121<H>  3.9   |  22  |  0.92      137  |  99  |  11  ----------------------------<  121<H>  3.7   |  21<L>  |  0.75    Ca    9.0      2018 03:30  Ca    8.9      2018 05:10  Phos  3.7       Phos  3.4       Mg     2.2       Mg     2.1         TPro  6.5  /  Alb  3.7  /  TBili  1.0  /  DBili  x   /  AST  45<H>  /  ALT  30  /  AlkPhos  75    TPro  6.1  /  Alb  3.6  /  TBili  0.9  /  DBili  x   /  AST  110<H>  /  ALT  33  /  AlkPhos  72        proBNP:   Lipid Profile:   HgA1c: Hemoglobin A1C, Whole Blood: 5.2 % ( @ 18:00)    TSH: Thyroid Stimulating Hormone, Serum: 8.06 uIU/mL ( @ 05:10)      CARDIAC MARKERS:  Troponin T, Serum: 2.35 ng/mL ( @ 05:10)  Troponin T, Serum: 4.62 ng/mL ( @ 18:00)  Troponin T, Serum: 0.19 ng/mL ( @ 09:52)  Troponin T, Serum: 0.13 ng/mL ( @ 09:45)    CKMB: 78.15 ng/mL ( @ 05:10)  CKMB: 301.20 ng/mL ( @ 18:00)    CKMB Relative Index: 10.3 ( @ 05:10)  CKMB Relative Index: 12.6 ( @ 18:00)

## 2018-04-19 NOTE — PROGRESS NOTE ADULT - PROBLEM SELECTOR PLAN 3
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  - on aspirin 81mg, high-intensity statin.

## 2018-04-19 NOTE — PROGRESS NOTE ADULT - PROBLEM SELECTOR PLAN 7
- DVT ppx: SQH   - Diet: DASH/TLC  - Dispo: home after 4-5 day monitoring s/p STEMI. Ambulates around with no issues at baseline. Will reassess need for PT eval.     Luca Choi MD   [id number]  Pager: [phone] - DVT ppx: SQH   - Diet: DASH/TLC  - Dispo: home after 4-5 day monitoring s/p STEMI. Ambulates around with no issues at baseline. Stable for transfer to floors.     Luca Choi MD   [id number]  Pager: [phone]

## 2018-04-20 VITALS — TEMPERATURE: 98 F

## 2018-04-20 LAB
ALBUMIN SERPL ELPH-MCNC: 3.4 G/DL — SIGNIFICANT CHANGE UP (ref 3.3–5)
ALP SERPL-CCNC: 66 U/L — SIGNIFICANT CHANGE UP (ref 40–120)
ALT FLD-CCNC: 27 U/L — SIGNIFICANT CHANGE UP (ref 4–33)
AST SERPL-CCNC: 34 U/L — HIGH (ref 4–32)
BILIRUB SERPL-MCNC: 0.7 MG/DL — SIGNIFICANT CHANGE UP (ref 0.2–1.2)
BUN SERPL-MCNC: 15 MG/DL — SIGNIFICANT CHANGE UP (ref 7–23)
CALCIUM SERPL-MCNC: 9.1 MG/DL — SIGNIFICANT CHANGE UP (ref 8.4–10.5)
CHLORIDE SERPL-SCNC: 98 MMOL/L — SIGNIFICANT CHANGE UP (ref 98–107)
CO2 SERPL-SCNC: 23 MMOL/L — SIGNIFICANT CHANGE UP (ref 22–31)
CREAT SERPL-MCNC: 0.83 MG/DL — SIGNIFICANT CHANGE UP (ref 0.5–1.3)
GLUCOSE SERPL-MCNC: 113 MG/DL — HIGH (ref 70–99)
HCT VFR BLD CALC: 44.4 % — SIGNIFICANT CHANGE UP (ref 34.5–45)
HGB BLD-MCNC: 14.4 G/DL — SIGNIFICANT CHANGE UP (ref 11.5–15.5)
MAGNESIUM SERPL-MCNC: 2.1 MG/DL — SIGNIFICANT CHANGE UP (ref 1.6–2.6)
MCHC RBC-ENTMCNC: 31.6 PG — SIGNIFICANT CHANGE UP (ref 27–34)
MCHC RBC-ENTMCNC: 32.4 % — SIGNIFICANT CHANGE UP (ref 32–36)
MCV RBC AUTO: 97.6 FL — SIGNIFICANT CHANGE UP (ref 80–100)
NRBC # FLD: 0 — SIGNIFICANT CHANGE UP
PHOSPHATE SERPL-MCNC: 3.7 MG/DL — SIGNIFICANT CHANGE UP (ref 2.5–4.5)
PLATELET # BLD AUTO: 265 K/UL — SIGNIFICANT CHANGE UP (ref 150–400)
PMV BLD: 10.7 FL — SIGNIFICANT CHANGE UP (ref 7–13)
POTASSIUM SERPL-MCNC: 3.8 MMOL/L — SIGNIFICANT CHANGE UP (ref 3.5–5.3)
POTASSIUM SERPL-SCNC: 3.8 MMOL/L — SIGNIFICANT CHANGE UP (ref 3.5–5.3)
PROT SERPL-MCNC: 6 G/DL — SIGNIFICANT CHANGE UP (ref 6–8.3)
RBC # BLD: 4.55 M/UL — SIGNIFICANT CHANGE UP (ref 3.8–5.2)
RBC # FLD: 13.2 % — SIGNIFICANT CHANGE UP (ref 10.3–14.5)
SODIUM SERPL-SCNC: 133 MMOL/L — LOW (ref 135–145)
WBC # BLD: 9.15 K/UL — SIGNIFICANT CHANGE UP (ref 3.8–10.5)
WBC # FLD AUTO: 9.15 K/UL — SIGNIFICANT CHANGE UP (ref 3.8–10.5)

## 2018-04-20 PROCEDURE — 99233 SBSQ HOSP IP/OBS HIGH 50: CPT

## 2018-04-20 RX ORDER — METOPROLOL TARTRATE 50 MG
1 TABLET ORAL
Qty: 30 | Refills: 11 | OUTPATIENT
Start: 2018-04-20 | End: 2019-04-14

## 2018-04-20 RX ORDER — METOPROLOL TARTRATE 50 MG
50 TABLET ORAL DAILY
Qty: 0 | Refills: 0 | Status: DISCONTINUED | OUTPATIENT
Start: 2018-04-20 | End: 2018-04-20

## 2018-04-20 RX ORDER — TICAGRELOR 90 MG/1
1 TABLET ORAL
Qty: 60 | Refills: 11 | OUTPATIENT
Start: 2018-04-20 | End: 2019-04-14

## 2018-04-20 RX ORDER — ASPIRIN/CALCIUM CARB/MAGNESIUM 324 MG
1 TABLET ORAL
Qty: 30 | Refills: 11 | OUTPATIENT
Start: 2018-04-20 | End: 2019-04-14

## 2018-04-20 RX ORDER — ATORVASTATIN CALCIUM 80 MG/1
1 TABLET, FILM COATED ORAL
Qty: 30 | Refills: 11 | OUTPATIENT
Start: 2018-04-20 | End: 2019-04-14

## 2018-04-20 RX ORDER — POTASSIUM CHLORIDE 20 MEQ
20 PACKET (EA) ORAL ONCE
Qty: 0 | Refills: 0 | Status: COMPLETED | OUTPATIENT
Start: 2018-04-20 | End: 2018-04-20

## 2018-04-20 RX ORDER — LISINOPRIL 2.5 MG/1
1 TABLET ORAL
Qty: 30 | Refills: 11 | OUTPATIENT
Start: 2018-04-20 | End: 2019-04-14

## 2018-04-20 RX ADMIN — Medication 20 MILLIEQUIVALENT(S): at 09:05

## 2018-04-20 RX ADMIN — Medication 100 MILLIGRAM(S): at 06:40

## 2018-04-20 RX ADMIN — LISINOPRIL 5 MILLIGRAM(S): 2.5 TABLET ORAL at 06:40

## 2018-04-20 RX ADMIN — Medication 25 MILLIGRAM(S): at 06:41

## 2018-04-20 RX ADMIN — Medication 81 MILLIGRAM(S): at 11:35

## 2018-04-20 RX ADMIN — HEPARIN SODIUM 5000 UNIT(S): 5000 INJECTION INTRAVENOUS; SUBCUTANEOUS at 06:41

## 2018-04-20 RX ADMIN — TICAGRELOR 90 MILLIGRAM(S): 90 TABLET ORAL at 06:40

## 2018-04-20 RX ADMIN — CHLORHEXIDINE GLUCONATE 1 APPLICATION(S): 213 SOLUTION TOPICAL at 11:35

## 2018-04-20 NOTE — PROGRESS NOTE ADULT - PROBLEM SELECTOR PLAN 1
- s/p aspirin and brillinta load, PCI to p/mid-LAD.  - TTE today.  - continue with high intensity statin, metoprolol 25 BID, aspirin 81 daily, and brillinta 90 BID.   - cardiac enzymes peaked and downtrended.   - lisinopril uptitrated to 5.0mg.   - importance of adherence and danger of stent occlusion explained thoroughly to patient. - s/p PCI to p/mid-LAD (long stent).  - TTE today.  - continue with high intensity statin, metoprolol 25 BID, aspirin 81 daily, and brillinta 90 BID. Will discharge on metoprolol succinate 50 daily.   - cardiac enzymes peaked and downtrended.   - lisinopril uptitrated to 5.0mg.   - importance of adherence and danger of stent occlusion explained thoroughly to patient.

## 2018-04-20 NOTE — PROGRESS NOTE ADULT - SUBJECTIVE AND OBJECTIVE BOX
CCU Progress Note     Interval events:     MEDICATIONS:  aspirin enteric coated 81 milliGRAM(s) Oral daily  heparin  Injectable 5000 Unit(s) SubCutaneous every 8 hours  lisinopril 5 milliGRAM(s) Oral daily  metoprolol tartrate 25 milliGRAM(s) Oral two times a day  ticagrelor 90 milliGRAM(s) Oral two times a day    LORazepam   Injectable 2 milliGRAM(s) IV Push every 4 hours PRN  melatonin 3 milliGRAM(s) Oral at bedtime  morphine  - Injectable 2 milliGRAM(s) IV Push every 6 hours PRN    docusate sodium 100 milliGRAM(s) Oral two times a day  senna 2 Tablet(s) Oral at bedtime    atorvastatin 80 milliGRAM(s) Oral at bedtime    chlorhexidine 4% Liquid 1 Application(s) Topical <User Schedule>  potassium chloride    Tablet ER 20 milliEquivalent(s) Oral once  potassium chloride    Tablet ER 20 milliEquivalent(s) Oral once    PHYSICAL EXAM:  T(C): 36.6 (04-20-18 @ 06:00), Max: 36.7 (04-19-18 @ 19:50)  HR: 85 (04-20-18 @ 07:00) (78 - 91)  BP: 117/61 (04-20-18 @ 07:00) (99/43 - 127/74)  RR: 20 (04-20-18 @ 07:00) (14 - 26)  SpO2: 92% (04-20-18 @ 07:00) (88% - 97%)  Wt(kg): --  I&O's Summary    19 Apr 2018 07:01  -  20 Apr 2018 07:00  --------------------------------------------------------  IN: 200 mL / OUT: 3 mL / NET: 197 mL    Daily     Daily       Appearance: NAD	  Cardiovascular: Normal S1 S2, No JVD although difficult to appreciate due to body habitus. No murmurs, No edema  Respiratory: Lungs clear to auscultation.	  Psychiatry: A & O x 3, Mood & affect appropriate  Gastrointestinal: +obese abdomen. Soft nt nd, normoactive bs.  Skin: Groin cath site C/D/I.	  Neurologic: Non-focal, walking around.   Vascular: Warm extremities with no peripheral edema.     LABS:	 	    CBC Full  -  ( 20 Apr 2018 04:50 )  WBC Count : 9.15 K/uL  Hemoglobin : 14.4 g/dL  Hematocrit : 44.4 %  Platelet Count - Automated : 265 K/uL  Mean Cell Volume : 97.6 fL  Mean Cell Hemoglobin : 31.6 pg  Mean Cell Hemoglobin Concentration : 32.4 %  Auto Neutrophil # : x  Auto Lymphocyte # : x  Auto Monocyte # : x  Auto Eosinophil # : x  Auto Basophil # : x  Auto Neutrophil % : x  Auto Lymphocyte % : x  Auto Monocyte % : x  Auto Eosinophil % : x  Auto Basophil % : x    04-20    133<L>  |  98  |  15  ----------------------------<  113<H>  3.8   |  23  |  0.83  04-19    136  |  98  |  16  ----------------------------<  121<H>  3.9   |  22  |  0.92    Ca    9.1      20 Apr 2018 04:50  Ca    9.0      19 Apr 2018 03:30  Phos  3.7     04-20  Phos  3.7     04-19  Mg     2.1     04-20  Mg     2.2     04-19    TPro  6.0  /  Alb  3.4  /  TBili  0.7  /  DBili  x   /  AST  34<H>  /  ALT  27  /  AlkPhos  66  04-20  TPro  6.5  /  Alb  3.7  /  TBili  1.0  /  DBili  x   /  AST  45<H>  /  ALT  30  /  AlkPhos  75  04-19      proBNP:   Lipid Profile:   HgA1c: Hemoglobin A1C, Whole Blood: 5.2 % (04-17 @ 18:00)    TSH:     CARDIAC MARKERS:  Troponin T, Serum: 2.35 ng/mL (04-18 @ 05:10)  Troponin T, Serum: 4.62 ng/mL (04-17 @ 18:00)  Troponin T, Serum: 0.19 ng/mL (04-17 @ 09:52)  Troponin T, Serum: 0.13 ng/mL (04-17 @ 09:45)      CKMB: 78.15 ng/mL (04-18 @ 05:10)  CKMB: 301.20 ng/mL (04-17 @ 18:00)    CKMB Relative Index: 10.3 (04-18 @ 05:10)  CKMB Relative Index: 12.6 (04-17 @ 18:00)      TELEMETRY: 	    ECG:  	  RADIOLOGY:  OTHER: 	    PREVIOUS DIAGNOSTIC TESTING:    [ ] Echocardiogram:  [ ] Catheterization:  [ ] Stress Test: CCU Progress Note     Interval events:   - no acute overnight events. Feeling well. Denies dyspnea, CP, palpitations.   - One recorded episode of desat to 88% on RA around 3AM (?YESSICA).    MEDICATIONS:  aspirin enteric coated 81 milliGRAM(s) Oral daily  heparin  Injectable 5000 Unit(s) SubCutaneous every 8 hours  lisinopril 5 milliGRAM(s) Oral daily  metoprolol tartrate 25 milliGRAM(s) Oral two times a day  ticagrelor 90 milliGRAM(s) Oral two times a day    LORazepam   Injectable 2 milliGRAM(s) IV Push every 4 hours PRN  melatonin 3 milliGRAM(s) Oral at bedtime  morphine  - Injectable 2 milliGRAM(s) IV Push every 6 hours PRN    docusate sodium 100 milliGRAM(s) Oral two times a day  senna 2 Tablet(s) Oral at bedtime    atorvastatin 80 milliGRAM(s) Oral at bedtime    chlorhexidine 4% Liquid 1 Application(s) Topical <User Schedule>  potassium chloride    Tablet ER 20 milliEquivalent(s) Oral once  potassium chloride    Tablet ER 20 milliEquivalent(s) Oral once    PHYSICAL EXAM:  T(C): 36.6 (04-20-18 @ 06:00), Max: 36.7 (04-19-18 @ 19:50)  HR: 85 (04-20-18 @ 07:00) (78 - 91)  BP: 117/61 (04-20-18 @ 07:00) (99/43 - 127/74)  RR: 20 (04-20-18 @ 07:00) (14 - 26)  SpO2: 92% (04-20-18 @ 07:00) (88% - 97%)  Wt(kg): --  I&O's Summary    19 Apr 2018 07:01  -  20 Apr 2018 07:00  --------------------------------------------------------  IN: 200 mL / OUT: 3 mL / NET: 197 mL    Daily     Daily       Appearance: NAD	  Cardiovascular: Normal S1 S2, No JVD although difficult to appreciate due to body habitus. No murmurs, No edema  Respiratory: Lungs clear to auscultation.	  Psychiatry: A & O x 3, Mood & affect appropriate  Gastrointestinal: +obese abdomen. Soft nt nd, normoactive bs.  Skin: Groin cath site C/D/I.	  Neurologic: Non-focal, walking around.   Vascular: Warm extremities with no peripheral edema.     LABS:	 	    CBC Full  -  ( 20 Apr 2018 04:50 )  WBC Count : 9.15 K/uL  Hemoglobin : 14.4 g/dL  Hematocrit : 44.4 %  Platelet Count - Automated : 265 K/uL  Mean Cell Volume : 97.6 fL  Mean Cell Hemoglobin : 31.6 pg  Mean Cell Hemoglobin Concentration : 32.4 %  Auto Neutrophil # : x  Auto Lymphocyte # : x  Auto Monocyte # : x  Auto Eosinophil # : x  Auto Basophil # : x  Auto Neutrophil % : x  Auto Lymphocyte % : x  Auto Monocyte % : x  Auto Eosinophil % : x  Auto Basophil % : x    04-20    133<L>  |  98  |  15  ----------------------------<  113<H>  3.8   |  23  |  0.83  04-19    136  |  98  |  16  ----------------------------<  121<H>  3.9   |  22  |  0.92    Ca    9.1      20 Apr 2018 04:50  Ca    9.0      19 Apr 2018 03:30  Phos  3.7     04-20  Phos  3.7     04-19  Mg     2.1     04-20  Mg     2.2     04-19    TPro  6.0  /  Alb  3.4  /  TBili  0.7  /  DBili  x   /  AST  34<H>  /  ALT  27  /  AlkPhos  66  04-20  TPro  6.5  /  Alb  3.7  /  TBili  1.0  /  DBili  x   /  AST  45<H>  /  ALT  30  /  AlkPhos  75  04-19      proBNP:   Lipid Profile:   HgA1c: Hemoglobin A1C, Whole Blood: 5.2 % (04-17 @ 18:00)    TSH:     CARDIAC MARKERS:  Troponin T, Serum: 2.35 ng/mL (04-18 @ 05:10)  Troponin T, Serum: 4.62 ng/mL (04-17 @ 18:00)  Troponin T, Serum: 0.19 ng/mL (04-17 @ 09:52)  Troponin T, Serum: 0.13 ng/mL (04-17 @ 09:45)      CKMB: 78.15 ng/mL (04-18 @ 05:10)  CKMB: 301.20 ng/mL (04-17 @ 18:00)    CKMB Relative Index: 10.3 (04-18 @ 05:10)  CKMB Relative Index: 12.6 (04-17 @ 18:00)      TELEMETRY: 	    ECG:  	  RADIOLOGY:  OTHER: 	    PREVIOUS DIAGNOSTIC TESTING:    [ ] Echocardiogram:  [ ] Catheterization:  [ ] Stress Test: CCU Progress Note     Interval events:   - no acute overnight events. Feeling well. Denies dyspnea, CP, palpitations.   - One recorded episode of desat to 88% on RA around 3AM (?YESSICA).    MEDICATIONS:  aspirin enteric coated 81 milliGRAM(s) Oral daily  heparin  Injectable 5000 Unit(s) SubCutaneous every 8 hours  lisinopril 5 milliGRAM(s) Oral daily  metoprolol tartrate 25 milliGRAM(s) Oral two times a day  ticagrelor 90 milliGRAM(s) Oral two times a day    LORazepam   Injectable 2 milliGRAM(s) IV Push every 4 hours PRN  melatonin 3 milliGRAM(s) Oral at bedtime  morphine  - Injectable 2 milliGRAM(s) IV Push every 6 hours PRN    docusate sodium 100 milliGRAM(s) Oral two times a day  senna 2 Tablet(s) Oral at bedtime    atorvastatin 80 milliGRAM(s) Oral at bedtime    chlorhexidine 4% Liquid 1 Application(s) Topical <User Schedule>  potassium chloride    Tablet ER 20 milliEquivalent(s) Oral once  potassium chloride    Tablet ER 20 milliEquivalent(s) Oral once    PHYSICAL EXAM:  T(C): 36.6 (04-20-18 @ 06:00), Max: 36.7 (04-19-18 @ 19:50)  HR: 85 (04-20-18 @ 07:00) (78 - 91)  BP: 117/61 (04-20-18 @ 07:00) (99/43 - 127/74)  RR: 20 (04-20-18 @ 07:00) (14 - 26)  SpO2: 92% (04-20-18 @ 07:00) (88% - 97%)  Wt(kg): --  I&O's Summary    19 Apr 2018 07:01  -  20 Apr 2018 07:00  --------------------------------------------------------  IN: 200 mL / OUT: 3 mL / NET: 197 mL    Daily     Daily       Appearance: NAD	  Cardiovascular: Normal S1 S2, No JVD although difficult to appreciate due to body habitus. No murmurs, No edema  Respiratory: Lungs clear to auscultation.	  Psychiatry: A & O x 3, Mood & affect appropriate  Gastrointestinal: +obese abdomen. Soft nt nd, normoactive bs.  Skin: Groin cath site C/D/I.	  Neurologic: Non-focal, walking around.   Vascular: Warm extremities with no peripheral edema.     LABS:	 	    CBC Full  -  ( 20 Apr 2018 04:50 )  WBC Count : 9.15 K/uL  Hemoglobin : 14.4 g/dL  Hematocrit : 44.4 %  Platelet Count - Automated : 265 K/uL  Mean Cell Volume : 97.6 fL  Mean Cell Hemoglobin : 31.6 pg  Mean Cell Hemoglobin Concentration : 32.4 %  Auto Neutrophil # : x  Auto Lymphocyte # : x  Auto Monocyte # : x  Auto Eosinophil # : x  Auto Basophil # : x  Auto Neutrophil % : x  Auto Lymphocyte % : x  Auto Monocyte % : x  Auto Eosinophil % : x  Auto Basophil % : x    04-20    133<L>  |  98  |  15  ----------------------------<  113<H>  3.8   |  23  |  0.83  04-19    136  |  98  |  16  ----------------------------<  121<H>  3.9   |  22  |  0.92    Ca    9.1      20 Apr 2018 04:50  Ca    9.0      19 Apr 2018 03:30  Phos  3.7     04-20  Phos  3.7     04-19  Mg     2.1     04-20  Mg     2.2     04-19    TPro  6.0  /  Alb  3.4  /  TBili  0.7  /  DBili  x   /  AST  34<H>  /  ALT  27  /  AlkPhos  66  04-20  TPro  6.5  /  Alb  3.7  /  TBili  1.0  /  DBili  x   /  AST  45<H>  /  ALT  30  /  AlkPhos  75  04-19      proBNP:   Lipid Profile:   HgA1c: Hemoglobin A1C, Whole Blood: 5.2 % (04-17 @ 18:00)    TSH:     CARDIAC MARKERS:  Troponin T, Serum: 2.35 ng/mL (04-18 @ 05:10)  Troponin T, Serum: 4.62 ng/mL (04-17 @ 18:00)  Troponin T, Serum: 0.19 ng/mL (04-17 @ 09:52)  Troponin T, Serum: 0.13 ng/mL (04-17 @ 09:45)      CKMB: 78.15 ng/mL (04-18 @ 05:10)  CKMB: 301.20 ng/mL (04-17 @ 18:00)    CKMB Relative Index: 10.3 (04-18 @ 05:10)  CKMB Relative Index: 12.6 (04-17 @ 18:00)

## 2018-04-20 NOTE — PROGRESS NOTE ADULT - ASSESSMENT
62-year-old lady with CVA (2015, MCA, left basal ganglia and frontal infarction, no tele events) and subclinical hypothyroidism, presenting with intermittent chest pressure with radiation bilaterally to upper extremities. Found to have AWSTEMI, now s/p PCI with 1 stent to proximal and mid-LAD.

## 2018-04-20 NOTE — PROGRESS NOTE ADULT - PROBLEM SELECTOR PLAN 4
- not on any home antihypertensives.   - hypertension now resolved after starting metoprolol tartrate 25 BID. Also on Lisinopril 5. - hypertension now resolved after starting metoprolol tartrate 25 BID. Also on Lisinopril 5.

## 2018-04-20 NOTE — PROGRESS NOTE ADULT - PROBLEM SELECTOR PLAN 6
- Hgb/Hct increased with associated increase in RBC count.   - likely 2/2 hypoxia, suspect patient is chronically hypoxic from YESSICA (not diagnosed).   - will monitor O2 via tele throughout the night to assess need for CPAP.   - outpatient f/u regarding any further w/u (i.e. sleep study).

## 2018-04-20 NOTE — PROGRESS NOTE ADULT - PROBLEM SELECTOR PROBLEM 6
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

## 2018-04-20 NOTE — PROGRESS NOTE ADULT - PROBLEM SELECTOR PLAN 7
- DVT ppx: SQH   - Diet: DASH/TLC  - Dispo: home after 4-5 day monitoring s/p STEMI. Ambulates around with no issues at baseline. Stable for transfer to floors.     Luca Choi MD   PGY1  Pager: [phone] - DVT ppx: SQH   - Diet: DASH/TLC  - Dispo: home today.     Luca Choi MD   PGY1  Pager: [phone]

## 2018-04-20 NOTE — PROGRESS NOTE ADULT - PROBLEM SELECTOR PLAN 2
- history of 2 shots of gins every day. Last drink on night of 4/16. Still within window of withdrawal.   - no signs of withdrawal. Now at 72hr jimmie.   - Ativan 2mg PRN IVP q4 with agitation. - history of 2 shots of gins every day. Last drink on night of 4/16.   - no signs of withdrawal. Now past withdrawal window.   - Ativan 2mg PRN IVP q4 with agitation.

## 2018-05-14 ENCOUNTER — RECORD ABSTRACTING (OUTPATIENT)
Age: 63
End: 2018-05-14

## 2018-05-14 DIAGNOSIS — R07.9 CHEST PAIN, UNSPECIFIED: ICD-10-CM

## 2018-05-14 DIAGNOSIS — E03.9 HYPOTHYROIDISM, UNSPECIFIED: ICD-10-CM

## 2018-05-15 ENCOUNTER — NON-APPOINTMENT (OUTPATIENT)
Age: 63
End: 2018-05-15

## 2018-05-15 ENCOUNTER — APPOINTMENT (OUTPATIENT)
Dept: CARDIOLOGY | Facility: CLINIC | Age: 63
End: 2018-05-15
Payer: COMMERCIAL

## 2018-05-15 VITALS
RESPIRATION RATE: 16 BRPM | SYSTOLIC BLOOD PRESSURE: 115 MMHG | DIASTOLIC BLOOD PRESSURE: 80 MMHG | OXYGEN SATURATION: 95 % | HEART RATE: 98 BPM | WEIGHT: 162 LBS | HEIGHT: 62 IN | BODY MASS INDEX: 29.81 KG/M2

## 2018-05-15 PROCEDURE — 99215 OFFICE O/P EST HI 40 MIN: CPT

## 2018-05-15 PROCEDURE — 36415 COLL VENOUS BLD VENIPUNCTURE: CPT

## 2018-05-15 PROCEDURE — 93000 ELECTROCARDIOGRAM COMPLETE: CPT

## 2018-05-15 RX ORDER — METOPROLOL SUCCINATE 50 MG/1
50 TABLET, EXTENDED RELEASE ORAL DAILY
Refills: 0 | Status: DISCONTINUED | COMMUNITY
End: 2018-05-15

## 2018-05-16 LAB
ALBUMIN SERPL ELPH-MCNC: 4.4 G/DL
ALP BLD-CCNC: 91 U/L
ALT SERPL-CCNC: 31 U/L
ANION GAP SERPL CALC-SCNC: 19 MMOL/L
AST SERPL-CCNC: 28 U/L
BILIRUB SERPL-MCNC: 0.8 MG/DL
BUN SERPL-MCNC: 10 MG/DL
CALCIUM SERPL-MCNC: 9.9 MG/DL
CHLORIDE SERPL-SCNC: 101 MMOL/L
CHOLEST SERPL-MCNC: 149 MG/DL
CHOLEST/HDLC SERPL: 3.2 RATIO
CO2 SERPL-SCNC: 20 MMOL/L
CREAT SERPL-MCNC: 0.79 MG/DL
GLUCOSE SERPL-MCNC: 70 MG/DL
HDLC SERPL-MCNC: 46 MG/DL
LDLC SERPL CALC-MCNC: 76 MG/DL
POTASSIUM SERPL-SCNC: 3.8 MMOL/L
PROT SERPL-MCNC: 7.8 G/DL
SODIUM SERPL-SCNC: 140 MMOL/L
TRIGL SERPL-MCNC: 136 MG/DL

## 2018-05-18 ENCOUNTER — RX RENEWAL (OUTPATIENT)
Age: 63
End: 2018-05-18

## 2018-06-12 ENCOUNTER — NON-APPOINTMENT (OUTPATIENT)
Age: 63
End: 2018-06-12

## 2018-06-12 ENCOUNTER — APPOINTMENT (OUTPATIENT)
Dept: CARDIOLOGY | Facility: CLINIC | Age: 63
End: 2018-06-12
Payer: COMMERCIAL

## 2018-06-12 VITALS
HEIGHT: 62 IN | RESPIRATION RATE: 16 BRPM | BODY MASS INDEX: 34.96 KG/M2 | OXYGEN SATURATION: 95 % | HEART RATE: 83 BPM | SYSTOLIC BLOOD PRESSURE: 134 MMHG | DIASTOLIC BLOOD PRESSURE: 85 MMHG | WEIGHT: 190 LBS

## 2018-06-12 VITALS — SYSTOLIC BLOOD PRESSURE: 113 MMHG | DIASTOLIC BLOOD PRESSURE: 79 MMHG

## 2018-06-12 PROCEDURE — 99214 OFFICE O/P EST MOD 30 MIN: CPT

## 2018-06-12 PROCEDURE — 93000 ELECTROCARDIOGRAM COMPLETE: CPT

## 2018-07-17 ENCOUNTER — APPOINTMENT (OUTPATIENT)
Dept: CV DIAGNOSITCS | Facility: HOSPITAL | Age: 63
End: 2018-07-17
Payer: COMMERCIAL

## 2018-07-17 ENCOUNTER — APPOINTMENT (OUTPATIENT)
Dept: CARDIOLOGY | Facility: CLINIC | Age: 63
End: 2018-07-17
Payer: COMMERCIAL

## 2018-07-17 ENCOUNTER — OUTPATIENT (OUTPATIENT)
Dept: OUTPATIENT SERVICES | Facility: HOSPITAL | Age: 63
LOS: 1 days | End: 2018-07-17

## 2018-07-17 VITALS
BODY MASS INDEX: 34.96 KG/M2 | WEIGHT: 190 LBS | RESPIRATION RATE: 16 BRPM | HEIGHT: 62 IN | DIASTOLIC BLOOD PRESSURE: 82 MMHG | HEART RATE: 78 BPM | SYSTOLIC BLOOD PRESSURE: 124 MMHG | OXYGEN SATURATION: 95 %

## 2018-07-17 DIAGNOSIS — I25.10 ATHEROSCLEROTIC HEART DISEASE OF NATIVE CORONARY ARTERY WITHOUT ANGINA PECTORIS: ICD-10-CM

## 2018-07-17 DIAGNOSIS — R06.02 SHORTNESS OF BREATH: ICD-10-CM

## 2018-07-17 PROBLEM — I63.9 CEREBRAL INFARCTION, UNSPECIFIED: Chronic | Status: ACTIVE | Noted: 2018-04-17

## 2018-07-17 PROCEDURE — 99215 OFFICE O/P EST HI 40 MIN: CPT

## 2018-07-17 PROCEDURE — 93306 TTE W/DOPPLER COMPLETE: CPT | Mod: 26

## 2018-07-17 PROCEDURE — 93000 ELECTROCARDIOGRAM COMPLETE: CPT

## 2018-07-22 ENCOUNTER — MOBILE ON CALL (OUTPATIENT)
Age: 63
End: 2018-07-22

## 2018-07-25 ENCOUNTER — APPOINTMENT (OUTPATIENT)
Dept: CV DIAGNOSTICS | Facility: HOSPITAL | Age: 63
End: 2018-07-25
Payer: COMMERCIAL

## 2018-07-25 ENCOUNTER — OUTPATIENT (OUTPATIENT)
Dept: OUTPATIENT SERVICES | Facility: HOSPITAL | Age: 63
LOS: 1 days | End: 2018-07-25

## 2018-07-25 DIAGNOSIS — R07.89 OTHER CHEST PAIN: ICD-10-CM

## 2018-07-25 PROCEDURE — 93018 CV STRESS TEST I&R ONLY: CPT | Mod: GC

## 2018-07-25 PROCEDURE — 93016 CV STRESS TEST SUPVJ ONLY: CPT | Mod: GC

## 2018-07-25 PROCEDURE — 78452 HT MUSCLE IMAGE SPECT MULT: CPT | Mod: 26

## 2018-07-31 ENCOUNTER — NON-APPOINTMENT (OUTPATIENT)
Age: 63
End: 2018-07-31

## 2018-07-31 ENCOUNTER — APPOINTMENT (OUTPATIENT)
Dept: CARDIOLOGY | Facility: CLINIC | Age: 63
End: 2018-07-31
Payer: COMMERCIAL

## 2018-07-31 VITALS
OXYGEN SATURATION: 98 % | HEART RATE: 75 BPM | RESPIRATION RATE: 16 BRPM | HEIGHT: 62 IN | WEIGHT: 188 LBS | BODY MASS INDEX: 34.6 KG/M2 | SYSTOLIC BLOOD PRESSURE: 123 MMHG | DIASTOLIC BLOOD PRESSURE: 80 MMHG | TEMPERATURE: 98.4 F

## 2018-07-31 PROCEDURE — 99215 OFFICE O/P EST HI 40 MIN: CPT

## 2018-10-30 ENCOUNTER — APPOINTMENT (OUTPATIENT)
Dept: CARDIOLOGY | Facility: CLINIC | Age: 63
End: 2018-10-30
Payer: COMMERCIAL

## 2018-10-30 ENCOUNTER — NON-APPOINTMENT (OUTPATIENT)
Age: 63
End: 2018-10-30

## 2018-10-30 VITALS
BODY MASS INDEX: 34.6 KG/M2 | DIASTOLIC BLOOD PRESSURE: 74 MMHG | SYSTOLIC BLOOD PRESSURE: 121 MMHG | HEART RATE: 85 BPM | RESPIRATION RATE: 16 BRPM | WEIGHT: 188 LBS | HEIGHT: 62 IN | OXYGEN SATURATION: 99 %

## 2018-10-30 PROCEDURE — 99215 OFFICE O/P EST HI 40 MIN: CPT

## 2018-10-30 PROCEDURE — 93000 ELECTROCARDIOGRAM COMPLETE: CPT

## 2018-10-30 RX ORDER — TICAGRELOR 90 MG/1
90 TABLET ORAL TWICE DAILY
Refills: 0 | Status: DISCONTINUED | COMMUNITY
End: 2018-10-30

## 2018-12-14 NOTE — PROGRESS NOTE ADULT - PROBLEM SELECTOR PROBLEM 1
STEMI (ST elevation myocardial infarction)
[facility]...

## 2019-03-01 ENCOUNTER — APPOINTMENT (OUTPATIENT)
Dept: CARDIOLOGY | Facility: CLINIC | Age: 64
End: 2019-03-01
Payer: COMMERCIAL

## 2019-03-01 ENCOUNTER — NON-APPOINTMENT (OUTPATIENT)
Age: 64
End: 2019-03-01

## 2019-03-01 VITALS
HEART RATE: 69 BPM | OXYGEN SATURATION: 97 % | WEIGHT: 200 LBS | BODY MASS INDEX: 36.8 KG/M2 | HEIGHT: 62 IN | SYSTOLIC BLOOD PRESSURE: 133 MMHG | DIASTOLIC BLOOD PRESSURE: 73 MMHG

## 2019-03-01 DIAGNOSIS — Z98.62 PERIPHERAL VASCULAR ANGIOPLASTY STATUS: ICD-10-CM

## 2019-03-01 PROCEDURE — 99215 OFFICE O/P EST HI 40 MIN: CPT

## 2019-03-01 PROCEDURE — 93000 ELECTROCARDIOGRAM COMPLETE: CPT

## 2019-03-01 NOTE — DISCUSSION/SUMMARY
[FreeTextEntry1] : 63 year old woman with CAD, AWMI in 2018 with PCI, now normal LV function, HLD here for followup\par \par 1. CAD- AWMI in 2018 with PCI to LAD, Nuclear stress in July 2018 that showed distal anterior and apical infarct with overall preserved EF. Currently on ASA and Brilinta (never switched to Plavix). EKG reviewed and without ischemic changes. \par Upon reivew of patient's [facility], there was a pLAD lesion that was also treated with mLAD stent. \par Given symptoms, will check nuclear stress test to see if progression\par 2. Chronic systolic heart failure- resolved- On Coreg 3.125 mg BID and Lisinopril 5 mg daily. Condition is stable. Continue present meds\par 3. HLD- On Lipitor 40 mg daily, diet discussed again. Patient with high fat eating habits she is trying to moderate. Continue present meds\par 4. FU in 4 months

## 2019-03-01 NOTE — PHYSICAL EXAM
[General Appearance - Well Developed] : well developed [Normal Appearance] : normal appearance [Well Groomed] : well groomed [General Appearance - Well Nourished] : well nourished [No Deformities] : no deformities [General Appearance - In No Acute Distress] : no acute distress [Normal Oral Mucosa] : normal oral mucosa [No Oral Pallor] : no oral pallor [No Oral Cyanosis] : no oral cyanosis [Normal Jugular Venous A Waves Present] : normal jugular venous A waves present [Normal Jugular Venous V Waves Present] : normal jugular venous V waves present [No Jugular Venous Prescott A Waves] : no jugular venous prescott A waves [Respiration, Rhythm And Depth] : normal respiratory rhythm and effort [Exaggerated Use Of Accessory Muscles For Inspiration] : no accessory muscle use [Auscultation Breath Sounds / Voice Sounds] : lungs were clear to auscultation bilaterally [Heart Rate And Rhythm] : heart rate and rhythm were normal [Heart Sounds] : normal S1 and S2 [Murmurs] : no murmurs present [Abdomen Soft] : soft [Abdomen Tenderness] : non-tender [Abdomen Mass (___ Cm)] : no abdominal mass palpated [Abnormal Walk] : normal gait [Gait - Sufficient For Exercise Testing] : the gait was sufficient for exercise testing [Nail Clubbing] : no clubbing of the fingernails [Cyanosis, Localized] : no localized cyanosis [Petechial Hemorrhages (___cm)] : no petechial hemorrhages [Skin Color & Pigmentation] : normal skin color and pigmentation [] : no rash [No Venous Stasis] : no venous stasis [Skin Lesions] : no skin lesions [No Skin Ulcers] : no skin ulcer [No Xanthoma] : no  xanthoma was observed [Oriented To Time, Place, And Person] : oriented to person, place, and time [Affect] : the affect was normal [Mood] : the mood was normal [No Anxiety] : not feeling anxious

## 2019-03-01 NOTE — HISTORY OF PRESENT ILLNESS
[FreeTextEntry1] : Here for followup. Accompanied by her . Still complaining of dyspnea with exertion. Cannot walk or use stairs that causes dyspnea. No chest pain. No left arm pain. \par 1. CAD- AWMI in 2018 with PCI to LAD, Nuclear stress in July 2018 that showed distal anterior and apical infarct with overall preserved EF. Currently on ASA and Brilinta (never switched to Plavix). EKG reviewed and without ischemic changes. \par Upon reivew of patient's [facility], there was a pLAD lesion that was also treated with mLAD stent. \par Given symptoms, will check nuclear stress test to see if progression\par 2. Chronic systolic heart failure- resolved- On Coreg 3.125 mg BID and Lisinopril 5 mg daily. Condition is stable. Continue present meds\par 3. HLD- On Lipitor 40 mg daily, diet discussed again. Patient with high fat eating habits she is trying to moderate. Continue present meds

## 2019-03-14 ENCOUNTER — RX RENEWAL (OUTPATIENT)
Age: 64
End: 2019-03-14

## 2019-03-15 ENCOUNTER — APPOINTMENT (OUTPATIENT)
Dept: CV DIAGNOSTICS | Facility: HOSPITAL | Age: 64
End: 2019-03-15
Payer: COMMERCIAL

## 2019-03-15 ENCOUNTER — OUTPATIENT (OUTPATIENT)
Dept: OUTPATIENT SERVICES | Facility: HOSPITAL | Age: 64
LOS: 1 days | End: 2019-03-15

## 2019-03-15 DIAGNOSIS — I21.02 ST ELEVATION (STEMI) MYOCARDIAL INFARCTION INVOLVING LEFT ANTERIOR DESCENDING CORONARY ARTERY: ICD-10-CM

## 2019-03-15 DIAGNOSIS — I25.10 ATHEROSCLEROTIC HEART DISEASE OF NATIVE CORONARY ARTERY WITHOUT ANGINA PECTORIS: ICD-10-CM

## 2019-03-15 DIAGNOSIS — R06.02 SHORTNESS OF BREATH: ICD-10-CM

## 2019-03-15 PROCEDURE — 93018 CV STRESS TEST I&R ONLY: CPT | Mod: GC

## 2019-03-15 PROCEDURE — 93016 CV STRESS TEST SUPVJ ONLY: CPT | Mod: GC

## 2019-03-15 PROCEDURE — 78452 HT MUSCLE IMAGE SPECT MULT: CPT | Mod: 26

## 2019-04-18 ENCOUNTER — MEDICATION RENEWAL (OUTPATIENT)
Age: 64
End: 2019-04-18

## 2019-05-16 ENCOUNTER — RX RENEWAL (OUTPATIENT)
Age: 64
End: 2019-05-16

## 2019-05-28 ENCOUNTER — EMERGENCY (EMERGENCY)
Facility: HOSPITAL | Age: 64
LOS: 1 days | Discharge: HOME | End: 2019-05-28
Attending: EMERGENCY MEDICINE | Admitting: EMERGENCY MEDICINE
Payer: COMMERCIAL

## 2019-05-28 VITALS
OXYGEN SATURATION: 100 % | HEART RATE: 105 BPM | RESPIRATION RATE: 16 BRPM | SYSTOLIC BLOOD PRESSURE: 145 MMHG | DIASTOLIC BLOOD PRESSURE: 81 MMHG | TEMPERATURE: 98 F

## 2019-05-28 VITALS
SYSTOLIC BLOOD PRESSURE: 143 MMHG | OXYGEN SATURATION: 100 % | RESPIRATION RATE: 18 BRPM | DIASTOLIC BLOOD PRESSURE: 60 MMHG | TEMPERATURE: 98 F | HEART RATE: 82 BPM

## 2019-05-28 PROCEDURE — 73630 X-RAY EXAM OF FOOT: CPT | Mod: 26,LT

## 2019-05-28 PROCEDURE — 73060 X-RAY EXAM OF HUMERUS: CPT | Mod: 26,LT

## 2019-05-28 PROCEDURE — 73590 X-RAY EXAM OF LOWER LEG: CPT | Mod: 26,LT

## 2019-05-28 PROCEDURE — 73610 X-RAY EXAM OF ANKLE: CPT | Mod: 26,LT

## 2019-05-28 PROCEDURE — 99284 EMERGENCY DEPT VISIT MOD MDM: CPT

## 2019-05-28 PROCEDURE — 73080 X-RAY EXAM OF ELBOW: CPT | Mod: 26,LT

## 2019-05-28 RX ORDER — ACETAMINOPHEN 500 MG
650 TABLET ORAL ONCE
Refills: 0 | Status: COMPLETED | OUTPATIENT
Start: 2019-05-28 | End: 2019-05-28

## 2019-05-28 RX ADMIN — Medication 650 MILLIGRAM(S): at 14:02

## 2019-05-28 NOTE — ED PROVIDER NOTE - NSFOLLOWUPINSTRUCTIONS_ED_ALL_ED_FT
Follow up with podiatry as listed above.    -- Please use 650-1000mg Tylenol (also called acetaminophen) every 6 hours and/or 400-600mg Motrin (also called Advil or ibuprofen) every 6 hours as needed for pain/discomfort/swelling. You can get these without a prescription. Don't use more than 3000mg of Tylenol in any 24-hour period. Make sure your other prescription/over-the-counter medications don't contain any Tylenol so you don't take too much. If you have any stomach discomfort while taking Motrin, you can use TUMS or Pepcid or Zantac (these can also be bought without a prescription).    use crutches and keep splint on.

## 2019-05-28 NOTE — CONSULT NOTE ADULT - ASSESSMENT
62 yo F w/ left 5th met fx  - XR positive for 5th met fracture  - Minimal pain, and swelling, no open wounds, neurovascular status intact  - Lightly compressive ACE applied to left foot, and a posterior splint  - Pt to be non weight bearing w/ crutches  - Discussed surgical and conservative options  - Will follow up w/ Dr. Isaac or Dr. Price w/i 1 week call [phone] to schedule an appt

## 2019-05-28 NOTE — ED PROVIDER NOTE - CARE PROVIDER_API CALL
Luis Isaac (DPM)  Podiatric Medicine and Surgery  [address] [place] [address]  Phone: [phone]  Fax: [phone]  Follow Up Time:

## 2019-05-28 NOTE — ED PROVIDER NOTE - CLINICAL SUMMARY MEDICAL DECISION MAKING FREE TEXT BOX
will need xrays of extremities including L foot/ankle/tibfib, L elbow/humerus given areas of injury and pain.  No signs/symptoms of head injury.

## 2019-05-28 NOTE — ED ADULT TRIAGE NOTE - CHIEF COMPLAINT QUOTE
pt comes to ED for fall yesterday. pt tripped down 2 steps yesterday pt denies hitting her head. pt denies any cp or dizziness prior to falling. pt is complaining of L arm pain from the fall L foot pain. pt denies LOC. pt is on blood thinners for heart condition pt comes to ED for fall yesterday. pt tripped down 2 steps yesterday pt denies hitting her head. pt denies any cp or dizziness prior to falling. pt is complaining of L arm pain from the fall L foot pain. pt denies LOC. pt is on blood thinners for heart condition. pt denies CP SOB

## 2019-05-28 NOTE — ED ADULT NURSE REASSESSMENT NOTE - REASSESS COMMUNICATION
pt for dc home, instructions given, crutches adjusted for patients arm length. crutch instructions given, pt able to demonstrate correct use of crutches. dcd home with family in pvt car.

## 2019-05-28 NOTE — CONSULT NOTE ADULT - SUBJECTIVE AND OBJECTIVE BOX
Patient is a 63y old  Female who presents with a chief complaint of left foot fx    HPI: 64 yo F states she was walking down 2-3 stairs yesterday when she slipped and fell. She relates she heard a crack and felt a big impact on her left foot. Does not recall any twisting. Does not complain of any other pain, no head injury. Pt states she has been "hobbling around" and has a feeling it is broken.       PAST MEDICAL & SURGICAL HISTORY:  CVA (cerebral vascular accident)  Obese   delivery delivered      MEDICATIONS  (STANDING):    MEDICATIONS  (PRN):      Allergies    No Known Allergies    Intolerances        VITALS:    Vital Signs Last 24 Hrs  T(C): 36.5 (28 May 2019 16:23), Max: 36.8 (28 May 2019 11:46)  T(F): 97.7 (28 May 2019 16:23), Max: 98.2 (28 May 2019 11:46)  HR: 82 (28 May 2019 16:23) (82 - 105)  BP: 143/60 (28 May 2019 16:23) (143/60 - 145/81)  BP(mean): --  RR: 18 (28 May 2019 16:23) (16 - 18)  SpO2: 100% (28 May 2019 16:23) (100% - 100%)    LABS:                CAPILLARY BLOOD GLUCOSE              LOWER EXTREMITY PHYSICAL EXAM:    Vascular: DP/PT 1/4 L foot (2/2 edema), 2/4 L foot  B/L, CFT <3, sec B/L, Temperature gradient WNL, B/L.   Neuro: Epicritic sensation intact to b/l feet  Musculoskeletal/Ortho: pain on palpation to lateral left foot at the level of the base of 5th metatarsal, muscle strength 5/5, guarded   Skin: No tenting, no blanching, no open wounds or abrasions, skin envelope intact, ecchymosis to lateral and dorsal left foot

## 2019-06-04 ENCOUNTER — APPOINTMENT (OUTPATIENT)
Dept: CARDIOLOGY | Facility: CLINIC | Age: 64
End: 2019-06-04
Payer: COMMERCIAL

## 2019-06-04 ENCOUNTER — NON-APPOINTMENT (OUTPATIENT)
Age: 64
End: 2019-06-04

## 2019-06-04 VITALS
OXYGEN SATURATION: 99 % | HEART RATE: 79 BPM | DIASTOLIC BLOOD PRESSURE: 81 MMHG | BODY MASS INDEX: 36.8 KG/M2 | SYSTOLIC BLOOD PRESSURE: 124 MMHG | RESPIRATION RATE: 16 BRPM | WEIGHT: 200 LBS | HEIGHT: 62 IN

## 2019-06-04 PROCEDURE — 93000 ELECTROCARDIOGRAM COMPLETE: CPT

## 2019-06-04 PROCEDURE — 36415 COLL VENOUS BLD VENIPUNCTURE: CPT

## 2019-06-04 PROCEDURE — 99214 OFFICE O/P EST MOD 30 MIN: CPT

## 2019-06-04 RX ORDER — CLOPIDOGREL BISULFATE 75 MG/1
75 TABLET, FILM COATED ORAL DAILY
Qty: 90 | Refills: 3 | Status: DISCONTINUED | COMMUNITY
Start: 2018-10-30 | End: 2019-06-04

## 2019-06-04 RX ORDER — CARVEDILOL 3.12 MG/1
3.12 TABLET, FILM COATED ORAL TWICE DAILY
Qty: 60 | Refills: 9 | Status: DISCONTINUED | COMMUNITY
Start: 2018-05-15 | End: 2019-06-04

## 2019-06-04 RX ORDER — TICAGRELOR 90 MG/1
90 TABLET ORAL TWICE DAILY
Qty: 60 | Refills: 1 | Status: DISCONTINUED | COMMUNITY
Start: 2019-04-18 | End: 2019-06-04

## 2019-06-04 NOTE — PHYSICAL EXAM
[General Appearance - Well Developed] : well developed [Well Groomed] : well groomed [General Appearance - Well Nourished] : well nourished [Normal Appearance] : normal appearance [Normal Oral Mucosa] : normal oral mucosa [General Appearance - In No Acute Distress] : no acute distress [No Deformities] : no deformities [No Oral Pallor] : no oral pallor [No Oral Cyanosis] : no oral cyanosis [Normal Jugular Venous A Waves Present] : normal jugular venous A waves present [Normal Jugular Venous V Waves Present] : normal jugular venous V waves present [No Jugular Venous Prescott A Waves] : no jugular venous prescott A waves [Heart Rate And Rhythm] : heart rate and rhythm were normal [Respiration, Rhythm And Depth] : normal respiratory rhythm and effort [Exaggerated Use Of Accessory Muscles For Inspiration] : no accessory muscle use [Auscultation Breath Sounds / Voice Sounds] : lungs were clear to auscultation bilaterally [Murmurs] : no murmurs present [Heart Sounds] : normal S1 and S2 [Abdomen Tenderness] : non-tender [Abdomen Soft] : soft [Abdomen Mass (___ Cm)] : no abdominal mass palpated [Abnormal Walk] : normal gait [Gait - Sufficient For Exercise Testing] : the gait was sufficient for exercise testing [Nail Clubbing] : no clubbing of the fingernails [Cyanosis, Localized] : no localized cyanosis [Petechial Hemorrhages (___cm)] : no petechial hemorrhages [Skin Color & Pigmentation] : normal skin color and pigmentation [] : no rash [No Venous Stasis] : no venous stasis [No Skin Ulcers] : no skin ulcer [Skin Lesions] : no skin lesions [No Xanthoma] : no  xanthoma was observed [Affect] : the affect was normal [Oriented To Time, Place, And Person] : oriented to person, place, and time [Mood] : the mood was normal [No Anxiety] : not feeling anxious

## 2019-06-04 NOTE — HISTORY OF PRESENT ILLNESS
[FreeTextEntry1] : Here for followup. Accompanied by her . No new complaints. No chest pain.\par 1. CAD- Patient with AWMI in April 2018 with PCI to LAD at that time, has been on ASA and Brilinta since that time (became nauseated with Plavix when once tried to switch)\par Nuclear stress in July 2018 showed distal anterior and apical infarct with overall normal EF. EKG reviewed and without ischemic changes. Condition is stable. Can DC Brilinta since MI >1 year ago and continue with ASA 81 mg daily. \par 2. Chronic systolic heart failure- resolved, on coreg and Lisinopril. Condition is stable. Continue present meds\par 3. HLD- on Lipitor 40 mg daily, condition is stable. Continue present meds

## 2019-06-04 NOTE — DISCUSSION/SUMMARY
[FreeTextEntry1] : 63 year old woman with CAD, HLD here for followup. Doing well. No complaints\par \par 1. CAD- Patient with AWMI in April 2018 with PCI to LAD at that time, has been on ASA and Brilinta since that time (became nauseated with Plavix when once tried to switch)\par Nuclear stress in July 2018 showed distal anterior and apical infarct with overall normal EF. EKG reviewed and without ischemic changes. Condition is stable. Can DC Brilinta since MI >1 year ago and continue with ASA 81 mg daily. \par 2. Chronic systolic heart failure- resolved, on coreg and Lisinopril. Condition is stable. Continue present meds\par 3. HLD- on Lipitor 40 mg daily, condition is stable. Continue present meds\par 4. FU in 6 month

## 2019-06-05 LAB
ALBUMIN SERPL ELPH-MCNC: 4.2 G/DL
ALP BLD-CCNC: 79 U/L
ALT SERPL-CCNC: 14 U/L
ANION GAP SERPL CALC-SCNC: 16 MMOL/L
AST SERPL-CCNC: 14 U/L
BILIRUB SERPL-MCNC: 0.5 MG/DL
BUN SERPL-MCNC: 14 MG/DL
CALCIUM SERPL-MCNC: 9.6 MG/DL
CHLORIDE SERPL-SCNC: 106 MMOL/L
CHOLEST SERPL-MCNC: 156 MG/DL
CHOLEST/HDLC SERPL: 2.6 RATIO
CO2 SERPL-SCNC: 20 MMOL/L
CREAT SERPL-MCNC: 0.77 MG/DL
ESTIMATED AVERAGE GLUCOSE: 111 MG/DL
GLUCOSE SERPL-MCNC: 62 MG/DL
HBA1C MFR BLD HPLC: 5.5 %
HDLC SERPL-MCNC: 60 MG/DL
LDLC SERPL CALC-MCNC: 79 MG/DL
POTASSIUM SERPL-SCNC: 4.4 MMOL/L
PROT SERPL-MCNC: 6.8 G/DL
SODIUM SERPL-SCNC: 142 MMOL/L
TRIGL SERPL-MCNC: 87 MG/DL

## 2020-02-28 ENCOUNTER — APPOINTMENT (OUTPATIENT)
Dept: CARDIOLOGY | Facility: CLINIC | Age: 65
End: 2020-02-28
Payer: MEDICARE

## 2020-02-28 ENCOUNTER — NON-APPOINTMENT (OUTPATIENT)
Age: 65
End: 2020-02-28

## 2020-02-28 VITALS
RESPIRATION RATE: 16 BRPM | OXYGEN SATURATION: 99 % | HEIGHT: 62 IN | WEIGHT: 200 LBS | DIASTOLIC BLOOD PRESSURE: 83 MMHG | BODY MASS INDEX: 36.8 KG/M2 | SYSTOLIC BLOOD PRESSURE: 131 MMHG | HEART RATE: 109 BPM

## 2020-02-28 PROCEDURE — 99214 OFFICE O/P EST MOD 30 MIN: CPT

## 2020-02-28 PROCEDURE — 93000 ELECTROCARDIOGRAM COMPLETE: CPT

## 2020-02-28 NOTE — PHYSICAL EXAM
[Normal Appearance] : normal appearance [General Appearance - Well Developed] : well developed [Well Groomed] : well groomed [General Appearance - Well Nourished] : well nourished [No Deformities] : no deformities [General Appearance - In No Acute Distress] : no acute distress [No Oral Cyanosis] : no oral cyanosis [No Oral Pallor] : no oral pallor [Normal Oral Mucosa] : normal oral mucosa [Normal Jugular Venous A Waves Present] : normal jugular venous A waves present [Normal Jugular Venous V Waves Present] : normal jugular venous V waves present [No Jugular Venous Prescott A Waves] : no jugular venous prescott A waves [Respiration, Rhythm And Depth] : normal respiratory rhythm and effort [Auscultation Breath Sounds / Voice Sounds] : lungs were clear to auscultation bilaterally [Exaggerated Use Of Accessory Muscles For Inspiration] : no accessory muscle use [Heart Sounds] : normal S1 and S2 [Heart Rate And Rhythm] : heart rate and rhythm were normal [Murmurs] : no murmurs present [Abdomen Soft] : soft [Abdomen Mass (___ Cm)] : no abdominal mass palpated [Abdomen Tenderness] : non-tender [Nail Clubbing] : no clubbing of the fingernails [Gait - Sufficient For Exercise Testing] : the gait was sufficient for exercise testing [Abnormal Walk] : normal gait [Cyanosis, Localized] : no localized cyanosis [Petechial Hemorrhages (___cm)] : no petechial hemorrhages [No Venous Stasis] : no venous stasis [] : no rash [Skin Color & Pigmentation] : normal skin color and pigmentation [No Skin Ulcers] : no skin ulcer [Skin Lesions] : no skin lesions [Oriented To Time, Place, And Person] : oriented to person, place, and time [No Xanthoma] : no  xanthoma was observed [Affect] : the affect was normal [Mood] : the mood was normal [No Anxiety] : not feeling anxious

## 2020-02-28 NOTE — DISCUSSION/SUMMARY
[FreeTextEntry1] : 64 year old woman with CAD, HFrecEF HLD here for followup\par \par 1. CAD- AWMI in April 2018 with PCI to LAD. On Daily ASA; Nuclear stress test in July 2018 with distal anterior and apical infarct. Overall Normal EF. EKG reviewed and without changes. Condition is stable. Continue present meds\par 2. Chronic systolic heart failure- resolved, on coreg and lisinopril. Condition is stable. Continue present meds\par 3. HLD- on statin daily. condition is stable. Continue present meds\par 4. Preoperative evaluation- Patient with MI in 2018 with PCI to LAD at that time, no NORMAL EF. Last nuclear stress test in July 2018. No chest pain, dyspnea or palpitations. EKG reviewed and unchanged. Patient is medically optimized from cardiac standpoint to proceed with cataract surgery. No further workup is needed at this time.\par 5. FU in 6 months

## 2020-02-28 NOTE — HISTORY OF PRESENT ILLNESS
[FreeTextEntry1] : Here for followup. Accompanied by her . Doing well. No complaints. Here for evaluation prior to cataract surgery of the left eye.\par 1. CAD- AWMI in April 2018 with PCI to LAD. On Daily ASA; Nuclear stress test in July 2018 with distal anterior and apical infarct. Overall Normal EF. EKG reviewed and without changes. Condition is stable. Continue present meds\par 2. Chronic systolic heart failure- resolved, on coreg and lisinopril. Condition is stable. Continue present meds\par 3. HLD- on statin daily. condition is stable. Continue present meds\par 4. Preoperative evaluation- Patient with MI in 2018 with PCI to LAD at that time, no NORMAL EF. Last nuclear stress test in July 2018. No chest pain, dyspnea or palpitations. EKG reviewed and unchanged. Patient is medically optimized from cardiac standpoint to proceed with cataract surgery. No further workup is needed at this time.

## 2020-08-18 ENCOUNTER — APPOINTMENT (OUTPATIENT)
Dept: CARDIOLOGY | Facility: CLINIC | Age: 65
End: 2020-08-18
Payer: MEDICARE

## 2020-08-18 ENCOUNTER — NON-APPOINTMENT (OUTPATIENT)
Age: 65
End: 2020-08-18

## 2020-08-18 VITALS
OXYGEN SATURATION: 99 % | BODY MASS INDEX: 36.8 KG/M2 | RESPIRATION RATE: 16 BRPM | SYSTOLIC BLOOD PRESSURE: 138 MMHG | HEIGHT: 62 IN | TEMPERATURE: 96.6 F | DIASTOLIC BLOOD PRESSURE: 85 MMHG | HEART RATE: 102 BPM | WEIGHT: 200 LBS

## 2020-08-18 DIAGNOSIS — I21.09 ST ELEVATION (STEMI) MYOCARDIAL INFARCTION INVOLVING OTHER CORONARY ARTERY OF ANTERIOR WALL: ICD-10-CM

## 2020-08-18 PROCEDURE — 93000 ELECTROCARDIOGRAM COMPLETE: CPT

## 2020-08-18 PROCEDURE — 99214 OFFICE O/P EST MOD 30 MIN: CPT

## 2020-08-18 NOTE — PHYSICAL EXAM
[General Appearance - Well Developed] : well developed [Normal Appearance] : normal appearance [Well Groomed] : well groomed [General Appearance - Well Nourished] : well nourished [No Deformities] : no deformities [General Appearance - In No Acute Distress] : no acute distress [Normal Conjunctiva] : the conjunctiva exhibited no abnormalities [Eyelids - No Xanthelasma] : the eyelids demonstrated no xanthelasmas [Normal Oral Mucosa] : normal oral mucosa [No Oral Cyanosis] : no oral cyanosis [No Oral Pallor] : no oral pallor [Normal Jugular Venous A Waves Present] : normal jugular venous A waves present [No Jugular Venous Prescott A Waves] : no jugular venous prescott A waves [Normal Jugular Venous V Waves Present] : normal jugular venous V waves present [Exaggerated Use Of Accessory Muscles For Inspiration] : no accessory muscle use [Respiration, Rhythm And Depth] : normal respiratory rhythm and effort [Auscultation Breath Sounds / Voice Sounds] : lungs were clear to auscultation bilaterally [Normal] : normal [Normal Rate] : normal [Normal S1] : normal S1 [Rhythm Regular] : regular [Normal S2] : normal S2 [Abdomen Tenderness] : non-tender [Abdomen Soft] : soft [Abdomen Mass (___ Cm)] : no abdominal mass palpated [Abnormal Walk] : normal gait [Gait - Sufficient For Exercise Testing] : the gait was sufficient for exercise testing [Cyanosis, Localized] : no localized cyanosis [Nail Clubbing] : no clubbing of the fingernails [Petechial Hemorrhages (___cm)] : no petechial hemorrhages [Skin Color & Pigmentation] : normal skin color and pigmentation [] : no rash [No Venous Stasis] : no venous stasis [Skin Lesions] : no skin lesions [No Skin Ulcers] : no skin ulcer [No Xanthoma] : no  xanthoma was observed [Oriented To Time, Place, And Person] : oriented to person, place, and time [Affect] : the affect was normal [Mood] : the mood was normal [No Anxiety] : not feeling anxious

## 2020-08-18 NOTE — HISTORY OF PRESENT ILLNESS
[FreeTextEntry1] : Here for followup. Accompanied by her . Doing well. No complaints. Here for evaluation prior to cataract surgery of the left eye. She was cleared for this surgery in February but was cancelled due to COVID. Feels now. No CP, SOB, dizziness, LH, palps. \par 1. CAD- AWMI in April 2018 with PCI to LAD. On Daily ASA; Nuclear stress test in July 2018 with distal anterior and apical infarct. Overall Normal EF. EKG reviewed and without changes. Condition is stable. Continue present meds\par 2. Chronic systolic heart failure- resolved, on coreg and lisinopril. Condition is stable. Continue present meds\par 3. HLD- on statin daily. condition is stable. Continue present meds\par 4. Preoperative evaluation- Patient with MI in 2018 with PCI to LAD at that time, no NORMAL EF. Last nuclear stress test in July 2018. No chest pain, dyspnea or palpitations. EKG reviewed and unchanged. Patient is medically optimized from cardiac standpoint to proceed with cataract surgery. No further workup is needed at this time.

## 2021-04-16 ENCOUNTER — APPOINTMENT (OUTPATIENT)
Dept: CARDIOLOGY | Facility: CLINIC | Age: 66
End: 2021-04-16
Payer: MEDICARE

## 2021-04-16 ENCOUNTER — NON-APPOINTMENT (OUTPATIENT)
Age: 66
End: 2021-04-16

## 2021-04-16 VITALS
TEMPERATURE: 97.6 F | RESPIRATION RATE: 16 BRPM | WEIGHT: 208 LBS | OXYGEN SATURATION: 99 % | HEART RATE: 93 BPM | BODY MASS INDEX: 38.28 KG/M2 | HEIGHT: 62 IN

## 2021-04-16 VITALS — DIASTOLIC BLOOD PRESSURE: 82 MMHG | SYSTOLIC BLOOD PRESSURE: 140 MMHG

## 2021-04-16 DIAGNOSIS — I21.02 ST ELEVATION (STEMI) MYOCARDIAL INFARCTION INVOLVING LEFT ANTERIOR DESCENDING CORONARY ARTERY: ICD-10-CM

## 2021-04-16 PROCEDURE — 99072 ADDL SUPL MATRL&STAF TM PHE: CPT

## 2021-04-16 PROCEDURE — 93000 ELECTROCARDIOGRAM COMPLETE: CPT

## 2021-04-16 PROCEDURE — 99214 OFFICE O/P EST MOD 30 MIN: CPT

## 2021-04-16 RX ORDER — ATORVASTATIN CALCIUM 40 MG/1
40 TABLET, FILM COATED ORAL
Qty: 90 | Refills: 2 | Status: DISCONTINUED | COMMUNITY
Start: 2019-05-16 | End: 2021-04-16

## 2021-04-16 NOTE — DISCUSSION/SUMMARY
[FreeTextEntry1] : 64 year old woman with CAD, HFrecEF HLD here for followup\par \par 1. CAD- AWMI in April 2018 with PCI to LAD. On Daily ASA; Nuclear stress test in July 2018 with distal anterior and apical infarct. Overall Normal EF. EKG reviewed and without changes. Condition is stable. Continue present meds\par 2. Chronic systolic heart failure- resolved, condition is stable. Stopped her Lisinopril but willing to restart\par 3. HLD- stopped her statin, will restart at a lower dose\par 4. HTN- BP elevated but she is not on medications. Will restart Lisinopril 5 mg daily\par 4. FU in 6 months

## 2021-04-16 NOTE — PHYSICAL EXAM
[General Appearance - Well Developed] : well developed [Normal Appearance] : normal appearance [Well Groomed] : well groomed [General Appearance - Well Nourished] : well nourished [No Deformities] : no deformities [General Appearance - In No Acute Distress] : no acute distress [Normal Conjunctiva] : the conjunctiva exhibited no abnormalities [Eyelids - No Xanthelasma] : the eyelids demonstrated no xanthelasmas [Normal Oral Mucosa] : normal oral mucosa [No Oral Pallor] : no oral pallor [No Oral Cyanosis] : no oral cyanosis [Normal Jugular Venous A Waves Present] : normal jugular venous A waves present [Normal Jugular Venous V Waves Present] : normal jugular venous V waves present [No Jugular Venous Prescott A Waves] : no jugular venous prescott A waves [Respiration, Rhythm And Depth] : normal respiratory rhythm and effort [Exaggerated Use Of Accessory Muscles For Inspiration] : no accessory muscle use [Auscultation Breath Sounds / Voice Sounds] : lungs were clear to auscultation bilaterally [Abdomen Soft] : soft [Abdomen Tenderness] : non-tender [Abdomen Mass (___ Cm)] : no abdominal mass palpated [Abnormal Walk] : normal gait [Gait - Sufficient For Exercise Testing] : the gait was sufficient for exercise testing [Nail Clubbing] : no clubbing of the fingernails [Cyanosis, Localized] : no localized cyanosis [Petechial Hemorrhages (___cm)] : no petechial hemorrhages [Skin Color & Pigmentation] : normal skin color and pigmentation [] : no rash [No Venous Stasis] : no venous stasis [Skin Lesions] : no skin lesions [No Skin Ulcers] : no skin ulcer [No Xanthoma] : no  xanthoma was observed [Oriented To Time, Place, And Person] : oriented to person, place, and time [Affect] : the affect was normal [Mood] : the mood was normal [No Anxiety] : not feeling anxious [Normal] : normal [Normal Rate] : normal [Rhythm Regular] : regular [Normal S1] : normal S1 [Normal S2] : normal S2

## 2021-04-16 NOTE — HISTORY OF PRESENT ILLNESS
[FreeTextEntry1] : Here for followup. Accompanied by her . Doing well. No complaints. \par 1. CAD- AWMI in April 2018 with PCI to LAD. On Daily ASA; Nuclear stress test in July 2018 with distal anterior and apical infarct. Overall Normal EF. EKG reviewed and without changes. Condition is stable. Continue present meds\par 2. Chronic systolic heart failure- resolved, condition is stable. Stopped her Lisinopril but willing to restart\par 3. HLD- stopped her statin, will restart at a lower dose\par 4. HTN- BP elevated but she is not on medications. Will restart Lisinopril 5 mg daily\par

## 2021-04-26 RX ORDER — ROSUVASTATIN CALCIUM 5 MG/1
5 TABLET, FILM COATED ORAL DAILY
Qty: 90 | Refills: 3 | Status: DISCONTINUED | COMMUNITY
Start: 2021-04-16 | End: 2021-04-26

## 2023-01-30 ENCOUNTER — APPOINTMENT (OUTPATIENT)
Dept: CARDIOLOGY | Facility: CLINIC | Age: 68
End: 2023-01-30
Payer: MEDICARE

## 2023-01-30 ENCOUNTER — NON-APPOINTMENT (OUTPATIENT)
Age: 68
End: 2023-01-30

## 2023-01-30 VITALS
SYSTOLIC BLOOD PRESSURE: 140 MMHG | HEIGHT: 62 IN | WEIGHT: 202 LBS | OXYGEN SATURATION: 97 % | DIASTOLIC BLOOD PRESSURE: 82 MMHG | BODY MASS INDEX: 37.17 KG/M2 | HEART RATE: 99 BPM

## 2023-01-30 DIAGNOSIS — E78.5 HYPERLIPIDEMIA, UNSPECIFIED: ICD-10-CM

## 2023-01-30 PROCEDURE — 99214 OFFICE O/P EST MOD 30 MIN: CPT | Mod: 25

## 2023-01-30 PROCEDURE — 93000 ELECTROCARDIOGRAM COMPLETE: CPT

## 2023-01-30 RX ORDER — ASPIRIN ENTERIC COATED TABLETS 81 MG 81 MG/1
81 TABLET, DELAYED RELEASE ORAL DAILY
Refills: 0 | Status: ACTIVE | COMMUNITY

## 2023-01-30 RX ORDER — PRAVASTATIN SODIUM 10 MG/1
10 TABLET ORAL
Qty: 30 | Refills: 0 | Status: DISCONTINUED | COMMUNITY
Start: 2021-04-26 | End: 2023-01-30

## 2023-01-30 RX ORDER — LISINOPRIL 5 MG/1
5 TABLET ORAL DAILY
Qty: 90 | Refills: 3 | Status: DISCONTINUED | COMMUNITY
End: 2023-01-30

## 2023-03-02 NOTE — DISCUSSION/SUMMARY
[EKG obtained to assist in diagnosis and management of assessed problem(s)] : EKG obtained to assist in diagnosis and management of assessed problem(s) [FreeTextEntry1] : 67 year old woman with CAD, HFrecEF HLD HTN here for followup. Recenlt had stopped all meds but now back on them ?TIA versus CVA at [facility]\par \par # CAD- AWMI in April 2018 with PCI to LAD. On Daily ASA; Nuclear stress test in July 2018 with distal anterior and apical infarct. Overall Normal EF. EKG reviewed and without changes. Condition is stable. Continue present meds\par # Chronic systolic heart failure- resolved, condition is stable.\par # HLD- stopped her statin, will restart at a lower dose\par # HTN- BP elevated but she is not on medications. Continue Amlodpine 5 mg daily, Metoprolol 12.5 mg BID\par # FU in 6 months

## 2023-03-02 NOTE — HISTORY OF PRESENT ILLNESS
[FreeTextEntry1] : Here for followup. Last seen in APril 2021. She admits that she stopped all of her medications and was eating very poorly. She developed high BP/MS change  and called EMS- brought to [facility] where she was observed for 24 hours. Diagnosed CVA. She then went to MD that gave her: Amlodipine 5 mg daily; Metoprolol 12.5 mg BID, Atorvastatin 40 mg daily, ASA 81 mg daily.\par # CAD- AWMI in April 2018 with PCI to LAD. On Daily ASA; Nuclear stress test in July 2018 with distal anterior and apical infarct. Overall Normal EF. EKG reviewed and without changes. Condition is stable. Continue present meds\par # Chronic systolic heart failure- resolved, condition is stable.\par # HLD- stopped her statin, will restart at a lower dose\par # HTN- BP elevated but she is not on medications. Continue Amlodipine 5 mg daily, Metoprolol 12.5 mg BID\par

## 2023-03-02 NOTE — REASON FOR VISIT
[Follow-Up - Clinic] : a clinic follow-up of [Hyperlipidemia] : hyperlipidemia [Hypertension] : hypertension [FreeTextEntry2] : CAD HLD HFrecEF

## 2023-06-19 ENCOUNTER — NON-APPOINTMENT (OUTPATIENT)
Age: 68
End: 2023-06-19

## 2023-06-19 ENCOUNTER — APPOINTMENT (OUTPATIENT)
Dept: CARDIOLOGY | Facility: CLINIC | Age: 68
End: 2023-06-19
Payer: MEDICARE

## 2023-06-19 VITALS
WEIGHT: 202 LBS | SYSTOLIC BLOOD PRESSURE: 123 MMHG | HEIGHT: 62 IN | DIASTOLIC BLOOD PRESSURE: 82 MMHG | HEART RATE: 90 BPM | OXYGEN SATURATION: 96 % | BODY MASS INDEX: 37.17 KG/M2

## 2023-06-19 PROCEDURE — 99214 OFFICE O/P EST MOD 30 MIN: CPT | Mod: 25

## 2023-06-19 PROCEDURE — 93000 ELECTROCARDIOGRAM COMPLETE: CPT

## 2023-06-19 NOTE — REASON FOR VISIT
[Hyperlipidemia] : hyperlipidemia [Hypertension] : hypertension [Follow-Up - Clinic] : a clinic follow-up of [FreeTextEntry2] : CAD HLD HFrecEF

## 2023-06-19 NOTE — DISCUSSION/SUMMARY
[EKG obtained to assist in diagnosis and management of assessed problem(s)] : EKG obtained to assist in diagnosis and management of assessed problem(s) [FreeTextEntry1] : 67 year old woman with CAD, HFrecEF HLD HTN here for followup. Recently had stopped all meds but now back on them ?TIA versus CVA at [facility]\par \par # CAD- AWMI in April 2018 with PCI to LAD. On Daily ASA; Nuclear stress test in July 2018 with distal anterior and apical infarct. Overall Normal EF. EKG reviewed and without changes. Condition is stable. Continue present meds\par # Chronic systolic heart failure-complains of occasional dyspnea. condition is stable.\par Check TTE\par # HLD- stopped her statin, will restart at a lower dose\par # HTN- BP elevated but she is not on medications. Continue Amlodipine 5 mg daily, Metoprolol 25 mg BID\par # FU in 6 months

## 2023-06-19 NOTE — HISTORY OF PRESENT ILLNESS
[FreeTextEntry1] : Here for followup. Last seen in APril 2021. She admits that she stopped all of her medications and was eating very poorly. She developed high BP/MS change  and called EMS- brought to [facility] where she was observed for 24 hours. Diagnosed CVA. She then went to MD that gave her: Amlodipine 5 mg daily; Metoprolol 12.5 mg BID, Atorvastatin 40 mg daily, ASA 81 mg daily.\par # CAD- AWMI in April 2018 with PCI to LAD. On Daily ASA; Nuclear stress test in July 2018 with distal anterior and apical infarct. Overall Normal EF. EKG reviewed and without changes. Condition is stable. Continue present meds\par # Chronic systolic heart failure-complains of occasional dyspnea. condition is stable.\par Check TTE.\par # HLD- stopped her statin, will restart at a lower dose\par # HTN- BP elevated but she is not on medications. Continue Amlodipine 5 mg daily, Metoprolol 25 mg BID\par

## 2023-06-19 NOTE — ADDENDUM
[FreeTextEntry1] : Patient for D/C; she is medically optimized from cardiac perspective with strict BP control for procedure. She should discuss with a neurologist given recent stroke.

## 2023-12-01 ENCOUNTER — OUTPATIENT (OUTPATIENT)
Dept: OUTPATIENT SERVICES | Facility: HOSPITAL | Age: 68
LOS: 1 days | End: 2023-12-01
Payer: MEDICARE

## 2023-12-01 ENCOUNTER — APPOINTMENT (OUTPATIENT)
Dept: CV DIAGNOSITCS | Facility: HOSPITAL | Age: 68
End: 2023-12-01

## 2023-12-01 DIAGNOSIS — I25.10 ATHEROSCLEROTIC HEART DISEASE OF NATIVE CORONARY ARTERY WITHOUT ANGINA PECTORIS: ICD-10-CM

## 2023-12-01 DIAGNOSIS — I50.22 CHRONIC SYSTOLIC (CONGESTIVE) HEART FAILURE: ICD-10-CM

## 2023-12-01 PROCEDURE — 93306 TTE W/DOPPLER COMPLETE: CPT | Mod: 26

## 2023-12-19 ENCOUNTER — APPOINTMENT (OUTPATIENT)
Dept: CARDIOLOGY | Facility: CLINIC | Age: 68
End: 2023-12-19
Payer: MEDICARE

## 2023-12-19 ENCOUNTER — NON-APPOINTMENT (OUTPATIENT)
Age: 68
End: 2023-12-19

## 2023-12-19 VITALS
BODY MASS INDEX: 37.17 KG/M2 | SYSTOLIC BLOOD PRESSURE: 139 MMHG | OXYGEN SATURATION: 94 % | HEART RATE: 94 BPM | WEIGHT: 202 LBS | HEIGHT: 62 IN | DIASTOLIC BLOOD PRESSURE: 87 MMHG

## 2023-12-19 DIAGNOSIS — I63.9 CEREBRAL INFARCTION, UNSPECIFIED: ICD-10-CM

## 2023-12-19 PROCEDURE — 93000 ELECTROCARDIOGRAM COMPLETE: CPT

## 2023-12-19 PROCEDURE — 99214 OFFICE O/P EST MOD 30 MIN: CPT | Mod: 25

## 2023-12-19 NOTE — DISCUSSION/SUMMARY
[FreeTextEntry1] : 68 year old woman with CAD, HFmild reducedEF HLD HTN here for followup.  # CAD- AWMI in April 2018 with PCI to LAD. On Daily ASA; Nuclear stress test in July 2018 with distal anterior and apical infarct. Overall Normal EF. EKG reviewed and without changes. Condition is stable. Continue present meds # Chronic systolic heart failure- condition is stable. EF45% Continue Toprol and add losartan # HLD- continue statin # HTN- BP elevated but she is not on medications. Continue Amlodipine 5 mg daily, Metoprolol 25 mg QD,  Add Losartan 25 mg daily  # FU in 6 months

## 2023-12-19 NOTE — HISTORY OF PRESENT ILLNESS
[FreeTextEntry1] : Here for followup. # CAD- AWMI in April 2018 with PCI to LAD. On Daily ASA; Nuclear stress test in July 2018 with distal anterior and apical infarct. Overall Normal EF. EKG reviewed and without changes. Condition is stable. Continue present meds # Chronic systolic heart failure- condition is stable. EF45% Continue Toprol and add losartan # HLD- continue statin # HTN- BP elevated but she is not on medications. Continue Amlodipine 5 mg daily, Metoprolol 25 mg QD,  Add Losartan 25 mg daily

## 2023-12-20 ENCOUNTER — RX RENEWAL (OUTPATIENT)
Age: 68
End: 2023-12-20

## 2024-06-21 ENCOUNTER — APPOINTMENT (OUTPATIENT)
Dept: CARDIOLOGY | Facility: CLINIC | Age: 69
End: 2024-06-21
Payer: MEDICARE

## 2024-06-21 ENCOUNTER — NON-APPOINTMENT (OUTPATIENT)
Age: 69
End: 2024-06-21

## 2024-06-21 VITALS
OXYGEN SATURATION: 94 % | SYSTOLIC BLOOD PRESSURE: 149 MMHG | WEIGHT: 124 LBS | BODY MASS INDEX: 22.82 KG/M2 | HEIGHT: 62 IN | HEART RATE: 101 BPM | DIASTOLIC BLOOD PRESSURE: 86 MMHG

## 2024-06-21 DIAGNOSIS — I25.10 ATHEROSCLEROTIC HEART DISEASE OF NATIVE CORONARY ARTERY W/OUT ANGINA PECTORIS: ICD-10-CM

## 2024-06-21 DIAGNOSIS — I10 ESSENTIAL (PRIMARY) HYPERTENSION: ICD-10-CM

## 2024-06-21 DIAGNOSIS — I50.22 CHRONIC SYSTOLIC (CONGESTIVE) HEART FAILURE: ICD-10-CM

## 2024-06-21 PROCEDURE — 93000 ELECTROCARDIOGRAM COMPLETE: CPT

## 2024-06-21 PROCEDURE — 99214 OFFICE O/P EST MOD 30 MIN: CPT

## 2024-06-21 PROCEDURE — G2211 COMPLEX E/M VISIT ADD ON: CPT

## 2024-06-21 RX ORDER — METOPROLOL SUCCINATE 25 MG/1
25 TABLET, EXTENDED RELEASE ORAL DAILY
Qty: 90 | Refills: 3 | Status: ACTIVE | COMMUNITY
Start: 2023-01-30 | End: 1900-01-01

## 2024-06-21 RX ORDER — AMLODIPINE BESYLATE 5 MG/1
5 TABLET ORAL
Qty: 90 | Refills: 3 | Status: ACTIVE | COMMUNITY
Start: 2022-10-23 | End: 1900-01-01

## 2024-06-21 RX ORDER — LOSARTAN POTASSIUM 25 MG/1
25 TABLET, FILM COATED ORAL DAILY
Qty: 90 | Refills: 3 | Status: ACTIVE | COMMUNITY
Start: 2023-12-19 | End: 1900-01-01

## 2024-06-21 RX ORDER — ATORVASTATIN CALCIUM 40 MG/1
40 TABLET, FILM COATED ORAL DAILY
Qty: 90 | Refills: 3 | Status: ACTIVE | COMMUNITY
Start: 2023-01-30 | End: 1900-01-01

## 2024-06-21 NOTE — DISCUSSION/SUMMARY
[FreeTextEntry1] : 68 year old woman with CAD, HFmild reducedEF HLD HTN here for followup.  # CAD- AWMI in April 2018 with PCI to LAD. On Daily ASA; Nuclear stress test in July 2018 with distal anterior and apical infarct. Overall Normal EF. EKG reviewed and without changes. Condition is stable. Continue present meds # Chronic systolic heart failure- condition is stable. EF45% Continue Toprol and add losartan # HLD- continue statin # HTN- BP elevated but she is not on medications. Continue Amlodipine 5 mg daily, Metoprolol 25 mg QD,  Added Losartan 25 mg daily  # FU in 6 months [EKG obtained to assist in diagnosis and management of assessed problem(s)] : EKG obtained to assist in diagnosis and management of assessed problem(s)

## 2024-06-21 NOTE — HISTORY OF PRESENT ILLNESS
Pt c/o body aches that started last night. Pt says she was on Lyrica, but is out of the prescriptions. Denies any falls or injury.    [FreeTextEntry1] : Here for followup. # CAD- AWMI in April 2018 with PCI to LAD. On Daily ASA; Nuclear stress test in July 2018 with distal anterior and apical infarct. Overall Normal EF. EKG reviewed and without changes. Condition is stable. Continue present meds # Chronic systolic heart failure- condition is stable. EF45% Continue Toprol and add losartan # HLD- continue statin # HTN- BP elevated but she is not on medications. Continue Amlodipine 5 mg daily, Metoprolol 25 mg QD,  Added Losartan 25 mg daily

## 2024-09-25 ENCOUNTER — NON-APPOINTMENT (OUTPATIENT)
Age: 69
End: 2024-09-25

## 2024-09-25 ENCOUNTER — APPOINTMENT (OUTPATIENT)
Dept: CARDIOLOGY | Facility: CLINIC | Age: 69
End: 2024-09-25
Payer: MEDICARE

## 2024-09-25 VITALS
OXYGEN SATURATION: 91 % | DIASTOLIC BLOOD PRESSURE: 86 MMHG | HEART RATE: 102 BPM | HEIGHT: 62 IN | BODY MASS INDEX: 36.8 KG/M2 | WEIGHT: 200 LBS | SYSTOLIC BLOOD PRESSURE: 140 MMHG

## 2024-09-25 DIAGNOSIS — I50.22 CHRONIC SYSTOLIC (CONGESTIVE) HEART FAILURE: ICD-10-CM

## 2024-09-25 DIAGNOSIS — I10 ESSENTIAL (PRIMARY) HYPERTENSION: ICD-10-CM

## 2024-09-25 DIAGNOSIS — I25.10 ATHEROSCLEROTIC HEART DISEASE OF NATIVE CORONARY ARTERY W/OUT ANGINA PECTORIS: ICD-10-CM

## 2024-09-25 PROCEDURE — 93000 ELECTROCARDIOGRAM COMPLETE: CPT

## 2024-09-25 PROCEDURE — 99214 OFFICE O/P EST MOD 30 MIN: CPT

## 2024-09-25 PROCEDURE — G2211 COMPLEX E/M VISIT ADD ON: CPT

## 2024-09-25 NOTE — DISCUSSION/SUMMARY
[EKG obtained to assist in diagnosis and management of assessed problem(s)] : EKG obtained to assist in diagnosis and management of assessed problem(s) [FreeTextEntry1] : 68 year old woman with CAD, HFmild reducedEF HLD HTN here for followup and eval prior to ROMANA-BSO  # CAD- AWMI in April 2018 with PCI to LAD. On Daily ASA; Nuclear stress test in July 2018 with distal anterior and apical infarct. . EKG reviewed and without changes. Condition is stable. Continue present meds # Chronic systolic heart failure- condition is stable. EF45% Continue Toprol and add losartan # HLD- continue statin # HTN-  Continue Amlodipine 5 mg daily, Metoprolol 25 mg QD,  Added Losartan 25 mg daily #Patient without ischemic eval since 2018- will arrange for pharm nuclear stress test- will check- if no ischemia- may proceed with ROMANA-BSO  # FU in 6 months

## 2024-09-25 NOTE — HISTORY OF PRESENT ILLNESS
[FreeTextEntry1] : Here for followup. TTE 12/2023:  1. Left ventricular cavity is small. Left ventricular wall thickness is normal. Left ventricular systolic function is mildly to moderately decreased with an ejection fraction visually estimated at 45 %. Regional wall motion abnormalities present.  2. Apical anterior segment and apex are abnormal.  3. There is mild (grade 1) left ventricular diastolic dysfunction.  4. Normal right ventricular cavity size and systolic function.  5. Normal atria.  6. No significant valvular disease.  7. No pericardial effusion seen.  8. The inferior vena cava is normal in size (normal <2.1cm) with normal inspiratory collapse (normal >50%) consistent with normal right atrial pressure (~3, range 0-5mmHg).  9. Technically difficult image quality. # CAD- AWMI in April 2018 with PCI to LAD. On Daily ASA; Nuclear stress test in July 2018 with distal anterior and apical infarct. . EKG reviewed and without changes. Condition is stable. Continue present meds # Chronic systolic heart failure- condition is stable. EF45% Continue Toprol and add losartan # HLD- continue statin # HTN-  Continue Amlodipine 5 mg daily, Metoprolol 25 mg QD,  Added Losartan 25 mg daily #Patient without ischemic eval since 2018- will arrange for pharm nuclear stress test- will check- if no ischemia- may proceed with ROMANA-O

## 2024-10-04 ENCOUNTER — APPOINTMENT (OUTPATIENT)
Dept: CV DIAGNOSTICS | Facility: HOSPITAL | Age: 69
End: 2024-10-04

## 2024-10-04 ENCOUNTER — RESULT REVIEW (OUTPATIENT)
Age: 69
End: 2024-10-04

## 2024-10-04 ENCOUNTER — OUTPATIENT (OUTPATIENT)
Dept: OUTPATIENT SERVICES | Facility: HOSPITAL | Age: 69
LOS: 1 days | End: 2024-10-04

## 2024-10-04 DIAGNOSIS — I50.22 CHRONIC SYSTOLIC (CONGESTIVE) HEART FAILURE: ICD-10-CM

## 2024-10-04 DIAGNOSIS — I25.10 ATHEROSCLEROTIC HEART DISEASE OF NATIVE CORONARY ARTERY WITHOUT ANGINA PECTORIS: ICD-10-CM

## 2024-10-04 DIAGNOSIS — I10 ESSENTIAL (PRIMARY) HYPERTENSION: ICD-10-CM

## 2024-10-04 PROCEDURE — 93016 CV STRESS TEST SUPVJ ONLY: CPT | Mod: GC,MC

## 2024-10-04 PROCEDURE — 78452 HT MUSCLE IMAGE SPECT MULT: CPT | Mod: 26,MC

## 2024-10-04 PROCEDURE — 93018 CV STRESS TEST I&R ONLY: CPT | Mod: GC,MC

## 2024-10-23 ENCOUNTER — NON-APPOINTMENT (OUTPATIENT)
Age: 69
End: 2024-10-23

## 2024-12-06 ENCOUNTER — APPOINTMENT (OUTPATIENT)
Dept: CARDIOLOGY | Facility: CLINIC | Age: 69
End: 2024-12-06

## 2025-01-29 NOTE — ED PROVIDER NOTE - PHYSICAL EXAMINATION
See refill request.   Gen: AAOx3, non-toxic  Head: NCAT  HEENT: EOMI, oral mucosa moist, normal conjunctiva  Lung: CTAB, no respiratory distress, no wheezes/rhonchi/rales B/L, speaking in full sentences  CV: tachycardic, no murmurs, rubs or gallops  Abd: soft, NTND, no guarding  MSK: no visible deformities  Neuro: No focal sensory or motor deficits  Skin: Warm, well perfused, no rash  Psych: normal affect.   ~Selena Olvera M.D. Resident

## 2025-03-27 ENCOUNTER — APPOINTMENT (OUTPATIENT)
Dept: CARDIOLOGY | Facility: CLINIC | Age: 70
End: 2025-03-27
Payer: MEDICARE

## 2025-03-27 ENCOUNTER — NON-APPOINTMENT (OUTPATIENT)
Age: 70
End: 2025-03-27

## 2025-03-27 VITALS
WEIGHT: 207 LBS | HEIGHT: 62 IN | BODY MASS INDEX: 38.09 KG/M2 | OXYGEN SATURATION: 95 % | DIASTOLIC BLOOD PRESSURE: 94 MMHG | SYSTOLIC BLOOD PRESSURE: 159 MMHG | HEART RATE: 94 BPM

## 2025-03-27 DIAGNOSIS — I10 ESSENTIAL (PRIMARY) HYPERTENSION: ICD-10-CM

## 2025-03-27 DIAGNOSIS — I50.22 CHRONIC SYSTOLIC (CONGESTIVE) HEART FAILURE: ICD-10-CM

## 2025-03-27 DIAGNOSIS — I25.10 ATHEROSCLEROTIC HEART DISEASE OF NATIVE CORONARY ARTERY W/OUT ANGINA PECTORIS: ICD-10-CM

## 2025-03-27 PROCEDURE — 99214 OFFICE O/P EST MOD 30 MIN: CPT

## 2025-03-27 PROCEDURE — 93000 ELECTROCARDIOGRAM COMPLETE: CPT

## 2025-03-27 PROCEDURE — G2211 COMPLEX E/M VISIT ADD ON: CPT

## 2025-05-30 ENCOUNTER — APPOINTMENT (OUTPATIENT)
Dept: CARDIOLOGY | Facility: CLINIC | Age: 70
End: 2025-05-30
Payer: MEDICARE

## 2025-05-30 ENCOUNTER — NON-APPOINTMENT (OUTPATIENT)
Age: 70
End: 2025-05-30

## 2025-05-30 VITALS
WEIGHT: 204 LBS | TEMPERATURE: 97.6 F | HEART RATE: 93 BPM | DIASTOLIC BLOOD PRESSURE: 94 MMHG | BODY MASS INDEX: 37.54 KG/M2 | RESPIRATION RATE: 16 BRPM | OXYGEN SATURATION: 96 % | HEIGHT: 62 IN | SYSTOLIC BLOOD PRESSURE: 143 MMHG

## 2025-05-30 DIAGNOSIS — I25.10 ATHEROSCLEROTIC HEART DISEASE OF NATIVE CORONARY ARTERY W/OUT ANGINA PECTORIS: ICD-10-CM

## 2025-05-30 DIAGNOSIS — I10 ESSENTIAL (PRIMARY) HYPERTENSION: ICD-10-CM

## 2025-05-30 DIAGNOSIS — I50.22 CHRONIC SYSTOLIC (CONGESTIVE) HEART FAILURE: ICD-10-CM

## 2025-05-30 PROCEDURE — 99215 OFFICE O/P EST HI 40 MIN: CPT

## 2025-05-30 PROCEDURE — G2211 COMPLEX E/M VISIT ADD ON: CPT

## 2025-05-30 PROCEDURE — 93000 ELECTROCARDIOGRAM COMPLETE: CPT

## 2025-06-24 ENCOUNTER — RX RENEWAL (OUTPATIENT)
Age: 70
End: 2025-06-24